# Patient Record
Sex: MALE | Race: WHITE | NOT HISPANIC OR LATINO | Employment: FULL TIME | URBAN - METROPOLITAN AREA
[De-identification: names, ages, dates, MRNs, and addresses within clinical notes are randomized per-mention and may not be internally consistent; named-entity substitution may affect disease eponyms.]

---

## 2017-06-02 ENCOUNTER — APPOINTMENT (OUTPATIENT)
Dept: MRI IMAGING | Facility: CLINIC | Age: 57
End: 2017-06-02
Attending: FAMILY MEDICINE
Payer: COMMERCIAL

## 2017-06-02 ENCOUNTER — HOSPITAL ENCOUNTER (EMERGENCY)
Facility: CLINIC | Age: 57
Discharge: HOME | End: 2017-06-02
Attending: FAMILY MEDICINE | Admitting: FAMILY MEDICINE
Payer: COMMERCIAL

## 2017-06-02 VITALS
DIASTOLIC BLOOD PRESSURE: 70 MMHG | RESPIRATION RATE: 17 BRPM | TEMPERATURE: 98.6 F | SYSTOLIC BLOOD PRESSURE: 130 MMHG | HEART RATE: 62 BPM | OXYGEN SATURATION: 97 %

## 2017-06-02 DIAGNOSIS — R29.898 LEFT LEG WEAKNESS: ICD-10-CM

## 2017-06-02 DIAGNOSIS — R27.0 ATAXIA: ICD-10-CM

## 2017-06-02 DIAGNOSIS — D64.9 ANEMIA, UNSPECIFIED TYPE: ICD-10-CM

## 2017-06-02 DIAGNOSIS — M54.12 C6 RADICULOPATHY: ICD-10-CM

## 2017-06-02 LAB
ALBUMIN SERPL-MCNC: 3.6 G/DL (ref 3.4–5)
ALP SERPL-CCNC: 49 U/L (ref 40–150)
ALT SERPL W P-5'-P-CCNC: 18 U/L (ref 0–70)
ANION GAP SERPL CALCULATED.3IONS-SCNC: 5 MMOL/L (ref 3–14)
AST SERPL W P-5'-P-CCNC: 18 U/L (ref 0–45)
BASOPHILS # BLD AUTO: 0 10E9/L (ref 0–0.2)
BASOPHILS NFR BLD AUTO: 0.5 %
BILIRUB SERPL-MCNC: 0.4 MG/DL (ref 0.2–1.3)
BUN SERPL-MCNC: 14 MG/DL (ref 7–30)
CALCIUM SERPL-MCNC: 8.9 MG/DL (ref 8.5–10.1)
CHLORIDE SERPL-SCNC: 108 MMOL/L (ref 94–109)
CO2 SERPL-SCNC: 28 MMOL/L (ref 20–32)
CREAT SERPL-MCNC: 1.01 MG/DL (ref 0.66–1.25)
DIFFERENTIAL METHOD BLD: ABNORMAL
EOSINOPHIL # BLD AUTO: 0.3 10E9/L (ref 0–0.7)
EOSINOPHIL NFR BLD AUTO: 4.2 %
ERYTHROCYTE [DISTWIDTH] IN BLOOD BY AUTOMATED COUNT: 12.7 % (ref 10–15)
GFR SERPL CREATININE-BSD FRML MDRD: 76 ML/MIN/1.7M2
GLUCOSE BLDC GLUCOMTR-MCNC: 135 MG/DL (ref 70–99)
GLUCOSE SERPL-MCNC: 114 MG/DL (ref 70–99)
HCT VFR BLD AUTO: 29.8 % (ref 40–53)
HGB BLD-MCNC: 10 G/DL (ref 13.3–17.7)
IMM GRANULOCYTES # BLD: 0.1 10E9/L (ref 0–0.4)
IMM GRANULOCYTES NFR BLD: 1.1 %
LYMPHOCYTES # BLD AUTO: 2 10E9/L (ref 0.8–5.3)
LYMPHOCYTES NFR BLD AUTO: 24.8 %
MCH RBC QN AUTO: 32.4 PG (ref 26.5–33)
MCHC RBC AUTO-ENTMCNC: 33.6 G/DL (ref 31.5–36.5)
MCV RBC AUTO: 96 FL (ref 78–100)
MONOCYTES # BLD AUTO: 0.5 10E9/L (ref 0–1.3)
MONOCYTES NFR BLD AUTO: 5.7 %
NEUTROPHILS # BLD AUTO: 5 10E9/L (ref 1.6–8.3)
NEUTROPHILS NFR BLD AUTO: 63.7 %
PLATELET # BLD AUTO: 248 10E9/L (ref 150–450)
POTASSIUM SERPL-SCNC: 4 MMOL/L (ref 3.4–5.3)
PROT SERPL-MCNC: 6.6 G/DL (ref 6.8–8.8)
RBC # BLD AUTO: 3.09 10E12/L (ref 4.4–5.9)
SODIUM SERPL-SCNC: 141 MMOL/L (ref 133–144)
TROPONIN I SERPL-MCNC: 0.04 UG/L (ref 0–0.04)
WBC # BLD AUTO: 7.9 10E9/L (ref 4–11)

## 2017-06-02 PROCEDURE — 99284 EMERGENCY DEPT VISIT MOD MDM: CPT | Mod: 25

## 2017-06-02 PROCEDURE — 93005 ELECTROCARDIOGRAM TRACING: CPT

## 2017-06-02 PROCEDURE — 25000128 H RX IP 250 OP 636: Performed by: FAMILY MEDICINE

## 2017-06-02 PROCEDURE — 99284 EMERGENCY DEPT VISIT MOD MDM: CPT | Mod: 25 | Performed by: FAMILY MEDICINE

## 2017-06-02 PROCEDURE — 85025 COMPLETE CBC W/AUTO DIFF WBC: CPT | Performed by: FAMILY MEDICINE

## 2017-06-02 PROCEDURE — 80053 COMPREHEN METABOLIC PANEL: CPT | Performed by: FAMILY MEDICINE

## 2017-06-02 PROCEDURE — 96361 HYDRATE IV INFUSION ADD-ON: CPT

## 2017-06-02 PROCEDURE — 82607 VITAMIN B-12: CPT | Performed by: FAMILY MEDICINE

## 2017-06-02 PROCEDURE — 70549 MR ANGIOGRAPH NECK W/O&W/DYE: CPT

## 2017-06-02 PROCEDURE — 00000146 ZZHCL STATISTIC GLUCOSE BY METER IP

## 2017-06-02 PROCEDURE — 70553 MRI BRAIN STEM W/O & W/DYE: CPT

## 2017-06-02 PROCEDURE — A9585 GADOBUTROL INJECTION: HCPCS | Performed by: FAMILY MEDICINE

## 2017-06-02 PROCEDURE — 84484 ASSAY OF TROPONIN QUANT: CPT | Performed by: FAMILY MEDICINE

## 2017-06-02 PROCEDURE — 93010 ELECTROCARDIOGRAM REPORT: CPT | Performed by: FAMILY MEDICINE

## 2017-06-02 PROCEDURE — 70544 MR ANGIOGRAPHY HEAD W/O DYE: CPT | Mod: XS

## 2017-06-02 PROCEDURE — 96374 THER/PROPH/DIAG INJ IV PUSH: CPT

## 2017-06-02 RX ORDER — NAPROXEN 500 MG/1
500 TABLET ORAL 2 TIMES DAILY PRN
Status: ON HOLD | COMMUNITY
End: 2022-03-10

## 2017-06-02 RX ORDER — SODIUM CHLORIDE 9 MG/ML
1000 INJECTION, SOLUTION INTRAVENOUS CONTINUOUS
Status: DISCONTINUED | OUTPATIENT
Start: 2017-06-02 | End: 2017-06-02 | Stop reason: HOSPADM

## 2017-06-02 RX ORDER — LORAZEPAM 2 MG/ML
.5-1 INJECTION INTRAMUSCULAR ONCE
Status: COMPLETED | OUTPATIENT
Start: 2017-06-02 | End: 2017-06-02

## 2017-06-02 RX ORDER — TRAZODONE HYDROCHLORIDE 100 MG/1
200 TABLET ORAL
COMMUNITY

## 2017-06-02 RX ORDER — GADOBUTROL 604.72 MG/ML
10 INJECTION INTRAVENOUS ONCE
Status: COMPLETED | OUTPATIENT
Start: 2017-06-02 | End: 2017-06-02

## 2017-06-02 RX ORDER — DULOXETIN HYDROCHLORIDE 60 MG/1
60 CAPSULE, DELAYED RELEASE ORAL DAILY
Status: ON HOLD | COMMUNITY
End: 2022-03-10

## 2017-06-02 RX ORDER — IOPAMIDOL 755 MG/ML
70 INJECTION, SOLUTION INTRAVASCULAR ONCE
Status: DISCONTINUED | OUTPATIENT
Start: 2017-06-02 | End: 2017-06-02 | Stop reason: HOSPADM

## 2017-06-02 RX ORDER — CLONAZEPAM 0.5 MG/1
1 TABLET ORAL AT BEDTIME
COMMUNITY

## 2017-06-02 RX ORDER — METHYLPREDNISOLONE 4 MG
TABLET, DOSE PACK ORAL
Qty: 21 TABLET | Refills: 0 | Status: SHIPPED | OUTPATIENT
Start: 2017-06-02 | End: 2022-03-07

## 2017-06-02 RX ORDER — MULTIVIT WITH MINERALS/LUTEIN
1 TABLET ORAL DAILY
COMMUNITY

## 2017-06-02 RX ADMIN — GADOBUTROL 10 ML: 604.72 INJECTION INTRAVENOUS at 16:47

## 2017-06-02 RX ADMIN — SODIUM CHLORIDE 1000 ML: 9 INJECTION, SOLUTION INTRAVENOUS at 16:31

## 2017-06-02 RX ADMIN — LORAZEPAM 0.5 MG: 2 INJECTION, SOLUTION INTRAMUSCULAR; INTRAVENOUS at 16:31

## 2017-06-02 ASSESSMENT — ENCOUNTER SYMPTOMS
ABDOMINAL PAIN: 0
DIARRHEA: 0
COUGH: 0
CHILLS: 0
FREQUENCY: 0
WHEEZING: 0
SINUS PRESSURE: 0
FEVER: 0
NUMBNESS: 1
SPEECH DIFFICULTY: 1
DIZZINESS: 1
DYSURIA: 0
SORE THROAT: 0
NAUSEA: 1
DIAPHORESIS: 0
WEAKNESS: 1
PALPITATIONS: 0
SHORTNESS OF BREATH: 0
CONSTIPATION: 0
VOMITING: 0
BLOOD IN STOOL: 0
HEADACHES: 1

## 2017-06-02 NOTE — ED NOTES
"Patient said, \"I been having pain down my left side for a couple of days.  I also have a headache that started yesterday.  I have shortness of breath that started last night.\"  Per girlfriend patient was stuttering and couldn't get a sentence out.\"  "

## 2017-06-02 NOTE — ED AVS SNAPSHOT
[facility] Emergency Department    [address] [facility] [address]    Phone:  [phone]    Fax:  [phone]                                       Nasim Montez   MRN: [id number]    Department:  [facility] Emergency Department   Date of Visit:  6/2/2017           Patient Information     Date Of Birth          1960        Your diagnoses for this visit were:     C6 radiculopathy start medrol dose pack and follow-up clinic.    Anemia, unspecified type Unclear cause.  follow-up clinic for testing of iron studies.  consider endoscopy for blood loss from intestinal tract.  B12 is pending.    Left leg weakness No signs of stroke on MRI.  These was a small tortuous vessel on MRI that could be tiny aneurysm.  consider repeat imaging as recommmended by radiology. Consider MRI spine (lumbar and/or cerical for central disc)    Ataxia Unclear cause.  await B12 test. normal imaging, labs.  could be related to central disc and the Left leg weakness       You were seen by Ian Renee MD.      Follow-up Information     Schedule an appointment as soon as possible for a visit with [facility].    Contact information:    [address] [facility] [address]          Follow up with [facility] Emergency Department.    Specialty:  EMERGENCY MEDICINE    Why:  As needed, If symptoms worsen    Contact information:    [address] [place] [id number] [phone]    Additional information:    The medical center is located at   [address] (between [address] and   [address] in [place], [place] miles north   of [place]).        Discharge Instructions         ICD-10-CM    1. C6 radiculopathy M54.12     start medrol dose pack and follow-up clinic.   2. Anemia, unspecified type D64.9     Unclear cause.  follow-up clinic for testing of iron studies.  consider endoscopy for blood loss from intestinal tract.  B12 is pending.   3. Left leg weakness M62.81     No signs of  stroke on MRI.  These was a small tortuous vessel on MRI that could be tiny aneurysm.  consider repeat imaging as recommmended by radiology. Consider MRI spine (lumbar and/or cerical for central disc)   4. Ataxia R27.0     Unclear cause.  await B12 test. normal imaging, labs.  could be related to central disc and the Left leg weakness         Anemia  Anemia is a condition that occurs when your body does not have enough healthy red blood cells (RBCs). Your RBCs are the parts of your blood that carry oxygen throughout your body. A protein called hemoglobin allows your RBCs to absorb and release oxygen. Without enough RBCs or hemoglobin, your body doesn't get enough oxygen. Symptoms of anemia may then occur.    Symptoms of anemia  Some people with anemia have no symptoms. But most people have symptoms that range from mild to severe. These can include:    Tiredness (fatigue)    Weakness    Pale skin    Shortness of breath    Dizziness or fainting    Rapid heartbeat    Trouble doing normal amounts of activity    Jaundice (yellowing of your eyes, skin, or mouth; dark urine)  Causes of anemia  Anemia can occur when your body:    Loses too much blood    Does not make enough RBCs    Destroys your RBCs at a faster rate than it can replace them    Does not make a normal amount of hemoglobin in your RBCs  These problems can occur for many reasons, including:    A condition that you are born with (congenital or inherited). This includes sickle cell disease or thalassemia.    Heavy bleeding for any reason, including injury, surgery, childbirth, or even heavy menstrual periods.    Being low in certain nutrients, such as iron, folate, or vitamin B12. This may be due to poor diet. Also, a condition like celiac disease or Crohn's disease can cause poor absorption of these nutrients    Certain chronic conditions like diabetes, arthritis, or kidney disease.    Certain chronic infections like tuberculosis or HIV.    Exposure to certain  medications, such as those used for chemotherapy.  There are different types of anemia. Your doctor can tell you more about the type of anemia you have and what may have caused it.  Diagnosing anemia  To diagnose anemia, your doctor gives you blood tests. These can include:    Complete blood cell count (CBC). This test measures the amounts of the different types of blood cells.    Blood smear. This test checks the size and shape of your blood cells. To perform the test, your doctor views a drop of your blood under a microscope. Your doctor uses a stain to make the blood cells easier to see.    Iron studies. These tests measure the amount of iron in your blood. Your body needs iron to make hemoglobin in your RBCs.    Vitamin B12 and folate studies. These tests check for some of the components that help give RBCs a normal size and shape.    Reticulocyte count. This test measures the amount of new RBCs that your bone marrow makes.    Hemoglobin electrophoresis. This test checks for problems with your hemoglobin in RBCs.  Treating anemia  Treatment for anemia is based on the type of anemia, its cause, and the severity of your symptoms. Treatments may include:    Diet changes. This involves increasing the amount of certain nutrients in your diet, such as iron, vitamin B12, or folate. Your doctor may also prescribe nutrient supplements.    Medications. Certain medications treat the cause of your anemia. Others help build new RBCs or relieve symptoms. If a medication is the cause of your anemia, you may need to stop or change it.    Blood transfusions. Replacing some of your blood can increase the number of healthy RBCs in your body.    Surgery. In some cases, your doctor can do surgery to treat the underlying cause of anemia. If you need surgery, your doctor will explain the procedure and outline the risks and benefits for you.  Long-term concerns  If you have a certain type of anemia, you can expect a full recovery after  treatment. If you have other types of anemia (especially a type you're born with), you will need to manage it for life. Your doctor can tell you more.    1208-7061 [employer]. [address] [place] [address], [place] [address]. All rights reserved. This information is not intended as a substitute for professional medical care. Always follow your healthcare professional's instructions.          Pinched Nerve in the Neck  A pinched nerve in the neck (cervical radiculopathy) is caused when the nerve that goes from the spinal cord to the arm is irritated or has pressure on it. This may be caused by a bulging spinal disk. A spinal disk is the cushion between each spinal bone. Or it may be caused by a narrowing of the spinal joint because of arthritis.    A pinched nerve can cause numbness, tingling, deep aching, or electrical shooting pain from the side of the neck all the way down to the fingers on one side.  A pinched nerve may begin after a sudden turning or bending force (such as in a car accident) or after a simple awkward movement. In either case, muscle spasm is commonly present and adds to the pain.  Home care  Follow these guidelines when caring for yourself at home:    Rest and relax the muscles. Use a comfortable pillow that supports your head and keeps your spine in a natural (neutral) position. Your head shouldn t be tilted forward or backward. A rolled-up towel may help for a custom fit. When standing or sitting, keep your neck in line with your body. Keep your head up and shoulders down. Stay away from activities that require you to move your neck a lot.    You can use heat and massage to help ease the pain. Take a hot shower or bath, or use a heating pad. You can also use a cold pack for relief. You can make a cold pack by wrapping a plastic bag of crushed or cubed ice in a thin towel. Try both heat and cold, and use the method that feels best. Do this for 20 minutes several times a day.    You may  use acetaminophen or ibuprofen to control pain, unless another pain medicine was prescribed. If you have chronic liver or kidney disease, talk with your health care provider before using these medicines. Also talk with your provider if you ve had a stomach ulcer or GI bleeding.    Reduce stress. Stress can make it longer for your pain to go away.    Do any exercises or stretches that were given to you as part of your discharge plan.    Wear a soft collar, if prescribed.    You may need surgery for a more serious injury.  Follow-up care  Follow up with your health care provider, or as advised, if you don t start to get better after 1 week. You may need more tests. Tell your provider about any fever, chills, or weight loss.  If X-rays were taken, a radiologist will look at them. You will be told of any new findings that may affect your care.  When to seek medical advice  Call your health care provider right away if any of these occur:    Pain becomes worse even after taking prescribed pain medicine    Weakness in the arm    Numbness in the arm gets worse    Trouble breathing or swallowing       6339-8413 [employer]. [address] [place] [address]. All rights reserved. This information is not intended as a substitute for professional medical care. Always follow your healthcare professional's instructions.          24 Hour Appointment Hotline       To make an appointment at any [facility] clinic, call [phone] ([phone]). If you don't have a family doctor or clinic, we will help you find one. [facility] clinics are conveniently located to serve the needs of you and your family.             Review of your medicines      START taking        Dose / Directions Last dose taken    methylPREDNISolone 4 MG tablet   Commonly known as:  MEDROL DOSEPAK   Quantity:  21 tablet        Follow package instructions   Refills:  0          Our records show that you are taking the medicines listed below.  If these are incorrect, please call your family doctor or clinic.        Dose / Directions Last dose taken    CENTRUM SILVER per tablet   Dose:  1 tablet        Take 1 tablet by mouth daily   Refills:  0        CLONAZEPAM PO   Dose:  0.5 mg        Take 0.5 mg by mouth At Bedtime   Refills:  0        DULOXETINE HCL PO   Dose:  60 mg        Take 60 mg by mouth daily   Refills:  0        NAPROXEN PO   Dose:  500 mg        Take 500 mg by mouth daily   Refills:  0        RISPERIDONE PO   Dose:  0.5 mg        Take 0.5 mg by mouth 2 times daily   Refills:  0        TRAZODONE HCL PO   Dose:  100 mg        Take 100 mg by mouth At Bedtime   Refills:  0                Prescriptions were sent or printed at these locations (1 Prescription)                   [place], [place] - [address] [facility]   [address] [facility] [address]    Telephone:  [phone]   Fax:  [phone]   Hours:                  E-Prescribed (1 of 1)         methylPREDNISolone (MEDROL DOSEPAK) 4 MG tablet                Procedures and tests performed during your visit     CBC with platelets differential    Comprehensive metabolic panel    EKG 12 lead    Glucose by meter    Head MRA w/o contrast - STROKE PROTOCOL    MR Brain w/o & w Contrast    Neck MRA w & w/o contrast - STROKE PROTOCOL    Troponin I      Orders Needing Specimen Collection     None      Pending Results     No orders found from 5/31/2017 to 6/3/2017.            Pending Culture Results     No orders found from 5/31/2017 to 6/3/2017.            Pending Results Instructions     If you had any lab results that were not finalized at the time of your Discharge, you can call the ED Lab Result RN at [phone]. You will be contacted by this team for any positive Lab results or changes in treatment. The nurses are available 7 days a week from 10A to 6:30P.  You can leave a message 24 hours per day and they will return your call.        Test Results From Your Hospital  Stay        6/2/2017  4:11 PM      Component Results     Component Value Ref Range & Units Status    Glucose 135 (H) 70 - 99 mg/dL Final         6/2/2017  6:07 PM      Narrative     MR BRAIN WITHOUT AND WITH CONTRAST 6/2/2017 5:30 PM    HISTORY: Ataxia and acute weakness.    COMPARISON: None.    TECHNIQUE: Sagittal T1, axial T2, axial FLAIR, axial GRE, axial  diffusion scan, coronal FLAIR, axial post Gd enhanced T1 weighted  images. 10 mL Gadavist.    FINDINGS: No intracranial hemorrhage, mass, or recent infarct. No  pathologic enhancement with gadolinium. No significant white matter  disease in the hemispheres but minimal patchy T2 hyperintensity in the  central aida likely small vessel ischemic change. Moderate membrane  thickening right maxillary antrum. Possible polyp in the right  posterior nasal region.        Impression     IMPRESSION:  1. Brain appears normal.  2. Sinus disease and right posterior nasal fullness which could be a  polyp.    KIMI HARRIS MD         6/2/2017  4:40 PM      Component Results     Component Value Ref Range & Units Status    WBC 7.9 4.0 - 11.0 10e9/L Final    RBC Count 3.09 (L) 4.4 - 5.9 10e12/L Final    Hemoglobin 10.0 (L) 13.3 - 17.7 g/dL Final    Hematocrit 29.8 (L) 40.0 - 53.0 % Final    MCV 96 78 - 100 fl Final    MCH 32.4 26.5 - 33.0 pg Final    MCHC 33.6 31.5 - 36.5 g/dL Final    RDW 12.7 10.0 - 15.0 % Final    Platelet Count 248 150 - 450 10e9/L Final    Diff Method Automated Method  Final    % Neutrophils 63.7 % Final    % Lymphocytes 24.8 % Final    % Monocytes 5.7 % Final    % Eosinophils 4.2 % Final    % Basophils 0.5 % Final    % Immature Granulocytes 1.1 % Final    Absolute Neutrophil 5.0 1.6 - 8.3 10e9/L Final    Absolute Lymphocytes 2.0 0.8 - 5.3 10e9/L Final    Absolute Monocytes 0.5 0.0 - 1.3 10e9/L Final    Absolute Eosinophils 0.3 0.0 - 0.7 10e9/L Final    Absolute Basophils 0.0 0.0 - 0.2 10e9/L Final    Abs Immature Granulocytes 0.1 0 - 0.4 10e9/L Final          6/2/2017  4:58 PM      Component Results     Component Value Ref Range & Units Status    Sodium 141 133 - 144 mmol/L Final    Potassium 4.0 3.4 - 5.3 mmol/L Final    Chloride 108 94 - 109 mmol/L Final    Carbon Dioxide 28 20 - 32 mmol/L Final    Anion Gap 5 3 - 14 mmol/L Final    Glucose 114 (H) 70 - 99 mg/dL Final    Urea Nitrogen 14 7 - 30 mg/dL Final    Creatinine 1.01 0.66 - 1.25 mg/dL Final    GFR Estimate 76 >60 mL/min/1.7m2 Final    Non  GFR Calc    GFR Estimate If Black >90   GFR Calc   >60 mL/min/1.7m2 Final    Calcium 8.9 8.5 - 10.1 mg/dL Final    Bilirubin Total 0.4 0.2 - 1.3 mg/dL Final    Albumin 3.6 3.4 - 5.0 g/dL Final    Protein Total 6.6 (L) 6.8 - 8.8 g/dL Final    Alkaline Phosphatase 49 40 - 150 U/L Final    ALT 18 0 - 70 U/L Final    AST 18 0 - 45 U/L Final         6/2/2017  4:58 PM      Component Results     Component Value Ref Range & Units Status    Troponin I ES 0.042 0.000 - 0.045 ug/L Final    The 99th percentile for upper reference range is 0.045 ug/L.  Troponin values   in   the range of 0.045 - 0.120 ug/L may be associated with risks of adverse   clinical events.           6/2/2017  6:07 PM      Narrative     MRA ANGIOGRAM HEAD WITHOUT CONTRAST 6/2/2017 5:17 PM    HISTORY  Pain left neck to left hand.    COMPARISON: None.    TECHNIQUE: Routine [place] MRA.    FINDINGS: Large-caliber vessels are patent. Both anterior cerebral  arteries are supplied from the right which is a normal variant. There  is a hypoplastic left A1 segment. The left posterior cerebral artery  is supplied from the anterior circulation which is also a variant.  There is a prominent vascular structure in the region of the anterior  communicating artery which I believe is a tortuous right A1 segment. I  cannot entirely rule out the possibility of an aneurysm and I would  recommend a six-month [place] MRA to ensure that this area is  stable.        Impression      "IMPRESSION:  1. Nothing acute.  2. Tortuous right A1 segment in the region of the anterior  communicating artery which I believe is simulating an aneurysm. I  cannot completely exclude the possibility of a tiny anterior  communicating artery aneurysm, and a six-month follow-up is  recommended to ensure that this area stable.    KIMI HARRIS MD         6/2/2017  6:07 PM      Narrative     MRA ANGIOGRAM NECK WITHOUT AND WITH CONTRAST   6/2/2017 5:31 PM     HISTORY: Ataxia. Numbness left arm.    TECHNIQUE: 2D time-of-flight MR angiogram of the neck without contrast  and 3D MR angiogram of the neck with 10 mL gadolinium IV.  Estimates  of carotid stenoses are made relative to the distal internal carotid  artery diameters except as noted.    COMPARISON: None.    FINDINGS:    Right carotid: Normal.    Left carotid: Normal.    Vertebral and basilar: Normal.    Aortic arch and intrathoracic arteries: Not well seen.        Impression     IMPRESSION: Normal MR angiogram of the neck. No evidence for  dissection.    KIMI HARRIS MD                Thank you for choosing [facility]       Thank you for choosing [facility] for your care. Our goal is always to provide you with excellent care. Hearing back from our patients is one way we can continue to improve our services. Please take a few minutes to complete the written survey that you may receive in the mail after you visit with us. Thank you!        [employer] Information     [employer] lets you send messages to your doctor, view your test results, renew your prescriptions, schedule appointments and more. To sign up, go to [email or web address] . Click on \"Log in\" on the left side of the screen, which will take you to the Welcome page. Then click on \"Sign up Now\" on the right side of the page.     You will be asked to enter the access code listed below, as well as some personal information. Please follow the directions to create your username and password.     Your access code " is: [email or web address]  Expires: 2017  6:39 PM     Your access code will  in 90 days. If you need help or a new code, please call your [facility] clinic or [phone].        Care EveryWhere ID     This is your Care EveryWhere ID. This could be used by other organizations to access your [facility] medical records  [id number]        After Visit Summary       This is your record. Keep this with you and show to your community pharmacist(s) and doctor(s) at your next visit.

## 2017-06-02 NOTE — LETTER
[facility] EMERGENCY DEPARTMENT  [address] [facility] [address]  Phone: [phone]  Fax: [phone]    June 6, 2017      RE: Nasim Montez  [address]   [facility] [address]       To whom it may concern:    Nasim Montez was seen in our clinic today. He may return to work with the following: No working or lifting restrictions on or about June 6, 2017.          Sincerely,      Ian Renee MD

## 2017-06-02 NOTE — ED PROVIDER NOTES
History     Chief Complaint   Patient presents with     Numbness     numbness radiating down his left side. wife states he started studdering 1.5 hours ago. ha x 2 days     HPI  Nasim Montez is a 57 year old male who presents with ataxia and weakness and numbness starting in 48 hours ago.  He noted at that time that he was dizzy with vertigo, he noted weakness in the lower extremity left side, ataxic with ambulation.  Also with paresthesias and numbness in the left arm especially in the radial aspect of the arm with a C6 distribution especially with dorsiflexion of the wrist,.  Some numbness as well in the left leg as well.  Today his wife noted stuttering possibly one and a half hours prior to his presentation.  Remainder of the symptoms have been ongoing for the last 48 hours.  Denies head injury.  No seizure history.  No significant headache history.  Does have a headache frontal moderate intensity.  No change in his swallowing.  No CVA history.    followed by [facility]  bipolar disorder    Medications  Current Outpatient Prescriptions   Medication Sig Dispense Refill     CLONAZEPAM PO Take 0.5 mg by mouth At Bedtime       RISPERIDONE PO Take 0.5 mg by mouth 2 times daily       TRAZODONE HCL PO Take 100 mg by mouth At Bedtime       DULOXETINE HCL PO Take 60 mg by mouth daily       NAPROXEN PO Take 500 mg by mouth daily       Multiple Vitamins-Minerals (CENTRUM SILVER) per tablet Take 1 tablet by mouth daily             I have reviewed the Medications, Allergies, Past Medical and Surgical History, and Social History in the Epic system.    Review of Systems   Constitutional: Negative for chills, diaphoresis and fever.   HENT: Negative for ear pain, sinus pressure and sore throat.    Eyes: Negative for visual disturbance.   Respiratory: Negative for cough, shortness of breath and wheezing.    Cardiovascular: Negative for chest pain and palpitations.   Gastrointestinal: Positive for nausea. Negative for abdominal  pain, blood in stool, constipation, diarrhea and vomiting.   Genitourinary: Negative for dysuria, frequency and urgency.   Skin: Negative for rash.   Neurological: Positive for dizziness, speech difficulty, weakness, numbness and headaches.   All other systems reviewed and are negative.        Physical Exam   BP: 135/75  Heart Rate: 92  Resp: 12  SpO2: 97 %  Physical Exam   Constitutional: He is oriented to person, place, and time. No distress.   HENT:   Mouth/Throat: Oropharynx is clear and moist.   Neck: Neck supple.   Cardiovascular: Exam reveals no gallop and no friction rub.    No murmur heard.  Pulmonary/Chest: Effort normal. No respiratory distress. He has no wheezes. He has no rales.   Abdominal: He exhibits no distension. There is no tenderness. There is no rebound.   Musculoskeletal: He exhibits no edema.   Neurological: He is alert and oriented to person, place, and time. He displays normal reflexes. No cranial nerve deficit. He exhibits abnormal muscle tone. Coordination normal.   Skin: No rash noted. He is not diaphoretic.     Weakness in dorsiflexion of the left wrist and decreased sensation in the C6 distribution on the radial aspect of the arm.    ED Course     ED Course     Procedures        EKG done at 1609 hrs. demonstrates a sinus rhythm at 97 bpm with a normal axis and no ST change.  No acute changes.  Normal R progression and no Q waves.  Normal conduction.  Normal intervals.  No ectopy.  Impression sinus rhythm 97 bpm no old EKG to compare         The patient has stroke symptoms:           ED Stroke specific documentation           NIHSS PDF          Protocol PDF     Patient last known well time: 48 hours ago  ED Provider first to bedside at: 1635  Patient was not treated with TPA due to the following reason(s):  Out of the treatment time window    National Institutes [employer] Stroke Scale (Baseline)  Time Performed: 1635        Score    Level of consciousness: (0)   Alert, keenly responsive     LOC questions: (0)   Answers both questions correctly    LOC commands: (0)   Performs both tasks correctly    Best gaze: (0)   Normal    Visual: (0)   No visual loss    Facial palsy: (0)   Normal symmetrical movements    Motor arm (left): (0)   No drift  - but weakness in C6 distribution    Motor arm (right): (0)   No drift    Motor leg (left): (1)   Drift    Motor leg (right): (0)   No drift    Limb ataxia: (0)   Absent    Sensory: (1)   Mild to moderate sensory loss  - but focal C6    Best language: (0)   Normal- no aphasia    Dysarthria: (0)   Normal    Extinction and inattention: (0)   No abnormality        Total Score:  2        Stroke Mimics were considered (including migraine headache, seizure disorder, hypoglycemia (or hyperglycemia), head or spinal trauma, CNS infection, Toxin ingestion and shock state (e.g. sepsis) .      Evaluation/Treatement was delayed due to: The patient's presentation was after 48 hours.  MRI would only be available for another 30 minutes.  Some of the symptoms were posterior circulation with ataxia and vertiginous symptoms.  I new need MRI this evening.  There are no significant delays to MRI over CT and therefore he was sent to MRI.  Initially they were only available to perform the MRI and not the MRA.  However at a callback that they could perform both.                  Results for orders placed or performed during the hospital encounter of 06/02/17   MR Brain w/o & w Contrast    Narrative    MR BRAIN WITHOUT AND WITH CONTRAST 6/2/2017 5:30 PM    HISTORY: Ataxia and acute weakness.    COMPARISON: None.    TECHNIQUE: Sagittal T1, axial T2, axial FLAIR, axial GRE, axial  diffusion scan, coronal FLAIR, axial post Gd enhanced T1 weighted  images. 10 mL Gadavist.    FINDINGS: No intracranial hemorrhage, mass, or recent infarct. No  pathologic enhancement with gadolinium. No significant white matter  disease in the hemispheres but minimal patchy T2 hyperintensity in the  central aida  likely small vessel ischemic change. Moderate membrane  thickening right maxillary antrum. Possible polyp in the right  posterior nasal region.      Impression    IMPRESSION:  1. Brain appears normal.  2. Sinus disease and right posterior nasal fullness which could be a  polyp.    KIMI HARRIS MD   Head MRA w/o contrast - STROKE PROTOCOL    Narrative    MRA ANGIOGRAM HEAD WITHOUT CONTRAST 6/2/2017 5:17 PM    HISTORY  Pain left neck to left hand.    COMPARISON: None.    TECHNIQUE: Routine [facility] MRA.    FINDINGS: Large-caliber vessels are patent. Both anterior cerebral  arteries are supplied from the right which is a normal variant. There  is a hypoplastic left A1 segment. The left posterior cerebral artery  is supplied from the anterior circulation which is also a variant.  There is a prominent vascular structure in the region of the anterior  communicating artery which I believe is a tortuous right A1 segment. I  cannot entirely rule out the possibility of an aneurysm and I would  recommend a six-month [facility] MRA to ensure that this area is  stable.      Impression    IMPRESSION:  1. Nothing acute.  2. Tortuous right A1 segment in the region of the anterior  communicating artery which I believe is simulating an aneurysm. I  cannot completely exclude the possibility of a tiny anterior  communicating artery aneurysm, and a six-month follow-up is  recommended to ensure that this area stable.    KIMI HARRIS MD   Neck MRA w & w/o contrast - STROKE PROTOCOL    Narrative    MRA ANGIOGRAM NECK WITHOUT AND WITH CONTRAST   6/2/2017 5:31 PM     HISTORY: Ataxia. Numbness left arm.    TECHNIQUE: 2D time-of-flight MR angiogram of the neck without contrast  and 3D MR angiogram of the neck with 10 mL gadolinium IV.  Estimates  of carotid stenoses are made relative to the distal internal carotid  artery diameters except as noted.    COMPARISON: None.    FINDINGS:    Right carotid: Normal.    Left  carotid: Normal.    Vertebral and basilar: Normal.    Aortic arch and intrathoracic arteries: Not well seen.      Impression    IMPRESSION: Normal MR angiogram of the neck. No evidence for  dissection.    KIMI HARRIS MD   Glucose by meter   Result Value Ref Range    Glucose 135 (H) 70 - 99 mg/dL   CBC with platelets differential   Result Value Ref Range    WBC 7.9 4.0 - 11.0 10e9/L    RBC Count 3.09 (L) 4.4 - 5.9 10e12/L    Hemoglobin 10.0 (L) 13.3 - 17.7 g/dL    Hematocrit 29.8 (L) 40.0 - 53.0 %    MCV 96 78 - 100 fl    MCH 32.4 26.5 - 33.0 pg    MCHC 33.6 31.5 - 36.5 g/dL    RDW 12.7 10.0 - 15.0 %    Platelet Count 248 150 - 450 10e9/L    Diff Method Automated Method     % Neutrophils 63.7 %    % Lymphocytes 24.8 %    % Monocytes 5.7 %    % Eosinophils 4.2 %    % Basophils 0.5 %    % Immature Granulocytes 1.1 %    Absolute Neutrophil 5.0 1.6 - 8.3 10e9/L    Absolute Lymphocytes 2.0 0.8 - 5.3 10e9/L    Absolute Monocytes 0.5 0.0 - 1.3 10e9/L    Absolute Eosinophils 0.3 0.0 - 0.7 10e9/L    Absolute Basophils 0.0 0.0 - 0.2 10e9/L    Abs Immature Granulocytes 0.1 0 - 0.4 10e9/L   Comprehensive metabolic panel   Result Value Ref Range    Sodium 141 133 - 144 mmol/L    Potassium 4.0 3.4 - 5.3 mmol/L    Chloride 108 94 - 109 mmol/L    Carbon Dioxide 28 20 - 32 mmol/L    Anion Gap 5 3 - 14 mmol/L    Glucose 114 (H) 70 - 99 mg/dL    Urea Nitrogen 14 7 - 30 mg/dL    Creatinine 1.01 0.66 - 1.25 mg/dL    GFR Estimate 76 >60 mL/min/1.7m2    GFR Estimate If Black >90   GFR Calc   >60 mL/min/1.7m2    Calcium 8.9 8.5 - 10.1 mg/dL    Bilirubin Total 0.4 0.2 - 1.3 mg/dL    Albumin 3.6 3.4 - 5.0 g/dL    Protein Total 6.6 (L) 6.8 - 8.8 g/dL    Alkaline Phosphatase 49 40 - 150 U/L    ALT 18 0 - 70 U/L    AST 18 0 - 45 U/L   Troponin I   Result Value Ref Range    Troponin I ES 0.042 0.000 - 0.045 ug/L         Assessments & Plan (with Medical Decision Making)     MDM: Nasim Montez is a 57 year old male Who presented  with a mix of neurologic symptoms that had onset 48 hours prior. This was associated with vertigo, numbness and paresthesias in the C6 distribution of the left arm, A sense of left leg weakness, ataxia, and possible slurred speech this evening.  I was alerted to MRI only being available for proximally 30 minutes from the time of his presentation.  Some of his symptoms were posterior circulation including ataxia and vertigo, and as this had been ongoing for 48 hours  I moved immediately to MRI To definitively exclude CNS cause.  MRI was reassuring but incidental findings Were identified - including a tiny possible aneurysm vs tortuous vessels. [facility]. He's given precautions for return here.    I have reviewed the nursing notes.    I have reviewed the findings, diagnosis, plan and need for follow up with the patient.    New Prescriptions    No medications on file       Final diagnoses:   C6 radiculopathy - start medrol dose pack and follow-up clinic.   Anemia, unspecified type - Unclear cause.  follow-up clinic for testing of iron studies.  consider endoscopy for blood loss from intestinal tract.  B12 is pending.   Left leg weakness - No signs of stroke on MRI.  These was a small tortuous vessel on MRI that could be tiny aneurysm.  consider repeat imaging as recommmended by radiology. Consider MRI spine (lumbar and/or cerical for central disc)   Ataxia - Unclear cause.  await B12 test. normal imaging, labs.  could be related to central disc and the Left leg weakness       6/2/2017   [facility] EMERGENCY DEPARTMENT     Ian Renee MD  06/02/17 [id number]

## 2017-06-02 NOTE — LETTER
[facility] EMERGENCY DEPARTMENT  [address] [facility] [address]  [phone]    2017    Nasim Montez  [address]   [place] [address]  [phone] (home)     : 1960      To Whom it may concern:    Nasim Montez was seen in our Emergency Department today, 2017.  I expect his condition to improve over the next 3 days.  He may return to work/school when improved.    Sincerely,        Ian Renee

## 2017-06-02 NOTE — ED AVS SNAPSHOT
[facility] Emergency Department    [address] [facility] [address]    Phone:  [phone]    Fax:  [phone]                                       Nasim Montez   MRN: [id number]    Department:  [facility] Emergency Department   Date of Visit:  6/2/2017           After Visit Summary Signature Page     I have received my discharge instructions, and my questions have been answered. I have discussed any challenges I see with this plan with the nurse or doctor.    ..........................................................................................................................................  Patient/Patient Representative Signature      ..........................................................................................................................................  Patient Representative Print Name and Relationship to Patient    ..................................................               ................................................  Date                                            Time    ..........................................................................................................................................  Reviewed by Signature/Title    ...................................................              ..............................................  Date                                                            Time

## 2017-06-02 NOTE — DISCHARGE INSTRUCTIONS
ICD-10-CM    1. C6 radiculopathy M54.12     start medrol dose pack and follow-up clinic.   2. Anemia, unspecified type D64.9     Unclear cause.  follow-up clinic for testing of iron studies.  consider endoscopy for blood loss from intestinal tract.  B12 is pending.   3. Left leg weakness M62.81     No signs of stroke on MRI.  These was a small tortuous vessel on MRI that could be tiny aneurysm.  consider repeat imaging as recommmended by radiology. Consider MRI spine (lumbar and/or cerical for central disc)   4. Ataxia R27.0     Unclear cause.  await B12 test. normal imaging, labs.  could be related to central disc and the Left leg weakness         Anemia  Anemia is a condition that occurs when your body does not have enough healthy red blood cells (RBCs). Your RBCs are the parts of your blood that carry oxygen throughout your body. A protein called hemoglobin allows your RBCs to absorb and release oxygen. Without enough RBCs or hemoglobin, your body doesn't get enough oxygen. Symptoms of anemia may then occur.    Symptoms of anemia  Some people with anemia have no symptoms. But most people have symptoms that range from mild to severe. These can include:    Tiredness (fatigue)    Weakness    Pale skin    Shortness of breath    Dizziness or fainting    Rapid heartbeat    Trouble doing normal amounts of activity    Jaundice (yellowing of your eyes, skin, or mouth; dark urine)  Causes of anemia  Anemia can occur when your body:    Loses too much blood    Does not make enough RBCs    Destroys your RBCs at a faster rate than it can replace them    Does not make a normal amount of hemoglobin in your RBCs  These problems can occur for many reasons, including:    A condition that you are born with (congenital or inherited). This includes sickle cell disease or thalassemia.    Heavy bleeding for any reason, including injury, surgery, childbirth, or even heavy menstrual periods.    Being low in certain nutrients, such as  iron, folate, or vitamin B12. This may be due to poor diet. Also, a condition like celiac disease or Crohn's disease can cause poor absorption of these nutrients    Certain chronic conditions like diabetes, arthritis, or kidney disease.    Certain chronic infections like tuberculosis or HIV.    Exposure to certain medications, such as those used for chemotherapy.  There are different types of anemia. Your doctor can tell you more about the type of anemia you have and what may have caused it.  Diagnosing anemia  To diagnose anemia, your doctor gives you blood tests. These can include:    Complete blood cell count (CBC). This test measures the amounts of the different types of blood cells.    Blood smear. This test checks the size and shape of your blood cells. To perform the test, your doctor views a drop of your blood under a microscope. Your doctor uses a stain to make the blood cells easier to see.    Iron studies. These tests measure the amount of iron in your blood. Your body needs iron to make hemoglobin in your RBCs.    Vitamin B12 and folate studies. These tests check for some of the components that help give RBCs a normal size and shape.    Reticulocyte count. This test measures the amount of new RBCs that your bone marrow makes.    Hemoglobin electrophoresis. This test checks for problems with your hemoglobin in RBCs.  Treating anemia  Treatment for anemia is based on the type of anemia, its cause, and the severity of your symptoms. Treatments may include:    Diet changes. This involves increasing the amount of certain nutrients in your diet, such as iron, vitamin B12, or folate. Your doctor may also prescribe nutrient supplements.    Medications. Certain medications treat the cause of your anemia. Others help build new RBCs or relieve symptoms. If a medication is the cause of your anemia, you may need to stop or change it.    Blood transfusions. Replacing some of your blood can increase the number of  healthy RBCs in your body.    Surgery. In some cases, your doctor can do surgery to treat the underlying cause of anemia. If you need surgery, your doctor will explain the procedure and outline the risks and benefits for you.  Long-term concerns  If you have a certain type of anemia, you can expect a full recovery after treatment. If you have other types of anemia (especially a type you're born with), you will need to manage it for life. Your doctor can tell you more.    7548-6033 [employer]. [address] [place], [place] [id number]. All rights reserved. This information is not intended as a substitute for professional medical care. Always follow your healthcare professional's instructions.          Pinched Nerve in the Neck  A pinched nerve in the neck (cervical radiculopathy) is caused when the nerve that goes from the spinal cord to the arm is irritated or has pressure on it. This may be caused by a bulging spinal disk. A spinal disk is the cushion between each spinal bone. Or it may be caused by a narrowing of the spinal joint because of arthritis.    A pinched nerve can cause numbness, tingling, deep aching, or electrical shooting pain from the side of the neck all the way down to the fingers on one side.  A pinched nerve may begin after a sudden turning or bending force (such as in a car accident) or after a simple awkward movement. In either case, muscle spasm is commonly present and adds to the pain.  Home care  Follow these guidelines when caring for yourself at home:    Rest and relax the muscles. Use a comfortable pillow that supports your head and keeps your spine in a natural (neutral) position. Your head shouldn t be tilted forward or backward. A rolled-up towel may help for a custom fit. When standing or sitting, keep your neck in line with your body. Keep your head up and shoulders down. Stay away from activities that require you to move your neck a lot.    You can use heat and  massage to help ease the pain. Take a hot shower or bath, or use a heating pad. You can also use a cold pack for relief. You can make a cold pack by wrapping a plastic bag of crushed or cubed ice in a thin towel. Try both heat and cold, and use the method that feels best. Do this for 20 minutes several times a day.    You may use acetaminophen or ibuprofen to control pain, unless another pain medicine was prescribed. If you have chronic liver or kidney disease, talk with your health care provider before using these medicines. Also talk with your provider if you ve had a stomach ulcer or GI bleeding.    Reduce stress. Stress can make it longer for your pain to go away.    Do any exercises or stretches that were given to you as part of your discharge plan.    Wear a soft collar, if prescribed.    You may need surgery for a more serious injury.  Follow-up care  Follow up with your health care provider, or as advised, if you don t start to get better after 1 week. You may need more tests. Tell your provider about any fever, chills, or weight loss.  If X-rays were taken, a radiologist will look at them. You will be told of any new findings that may affect your care.  When to seek medical advice  Call your health care provider right away if any of these occur:    Pain becomes worse even after taking prescribed pain medicine    Weakness in the arm    Numbness in the arm gets worse    Trouble breathing or swallowing       5564-8885 [employer]. [address] [place] [address], [place] [address]. All rights reserved. This information is not intended as a substitute for professional medical care. Always follow your healthcare professional's instructions.

## 2017-06-03 LAB — VIT B12 SERPL-MCNC: 351 PG/ML (ref 193–986)

## 2019-06-25 ENCOUNTER — RECORDS - HEALTHEAST (OUTPATIENT)
Dept: ADMINISTRATIVE | Facility: OTHER | Age: 59
End: 2019-06-25

## 2019-06-25 ENCOUNTER — OFFICE VISIT - HEALTHEAST (OUTPATIENT)
Dept: FAMILY MEDICINE | Facility: CLINIC | Age: 59
End: 2019-06-25

## 2019-06-25 DIAGNOSIS — S46.101A INJURY OF TENDON OF LONG HEAD OF BICEPS, RIGHT, INITIAL ENCOUNTER: ICD-10-CM

## 2019-06-25 DIAGNOSIS — M25.511 ACUTE PAIN OF RIGHT SHOULDER: ICD-10-CM

## 2020-04-28 ENCOUNTER — APPOINTMENT (OUTPATIENT)
Dept: GENERAL RADIOLOGY | Facility: CLINIC | Age: 60
End: 2020-04-28
Attending: EMERGENCY MEDICINE
Payer: COMMERCIAL

## 2020-04-28 ENCOUNTER — HOSPITAL ENCOUNTER (EMERGENCY)
Facility: CLINIC | Age: 60
Discharge: HOME | End: 2020-04-29
Attending: EMERGENCY MEDICINE | Admitting: EMERGENCY MEDICINE
Payer: COMMERCIAL

## 2020-04-28 DIAGNOSIS — R07.9 ACUTE CHEST PAIN: ICD-10-CM

## 2020-04-28 LAB
ANION GAP SERPL CALCULATED.3IONS-SCNC: 7 MMOL/L (ref 3–14)
BASOPHILS # BLD AUTO: 0 10E9/L (ref 0–0.2)
BASOPHILS NFR BLD AUTO: 0.4 %
BUN SERPL-MCNC: 10 MG/DL (ref 7–30)
CALCIUM SERPL-MCNC: 8.4 MG/DL (ref 8.5–10.1)
CHLORIDE SERPL-SCNC: 108 MMOL/L (ref 94–109)
CO2 SERPL-SCNC: 25 MMOL/L (ref 20–32)
CREAT SERPL-MCNC: 0.95 MG/DL (ref 0.66–1.25)
DIFFERENTIAL METHOD BLD: NORMAL
EOSINOPHIL # BLD AUTO: 0.4 10E9/L (ref 0–0.7)
EOSINOPHIL NFR BLD AUTO: 6.3 %
ERYTHROCYTE [DISTWIDTH] IN BLOOD BY AUTOMATED COUNT: 11.9 % (ref 10–15)
GFR SERPL CREATININE-BSD FRML MDRD: 86 ML/MIN/{1.73_M2}
GLUCOSE SERPL-MCNC: 132 MG/DL (ref 70–99)
HCT VFR BLD AUTO: 41.1 % (ref 40–53)
HGB BLD-MCNC: 14.2 G/DL (ref 13.3–17.7)
IMM GRANULOCYTES # BLD: 0 10E9/L (ref 0–0.4)
IMM GRANULOCYTES NFR BLD: 0.4 %
LYMPHOCYTES # BLD AUTO: 2.5 10E9/L (ref 0.8–5.3)
LYMPHOCYTES NFR BLD AUTO: 36.2 %
MCH RBC QN AUTO: 32.1 PG (ref 26.5–33)
MCHC RBC AUTO-ENTMCNC: 34.5 G/DL (ref 31.5–36.5)
MCV RBC AUTO: 93 FL (ref 78–100)
MONOCYTES # BLD AUTO: 0.5 10E9/L (ref 0–1.3)
MONOCYTES NFR BLD AUTO: 7.2 %
NEUTROPHILS # BLD AUTO: 3.5 10E9/L (ref 1.6–8.3)
NEUTROPHILS NFR BLD AUTO: 49.5 %
NRBC # BLD AUTO: 0 10*3/UL
NRBC BLD AUTO-RTO: 0 /100
PLATELET # BLD AUTO: 210 10E9/L (ref 150–450)
POTASSIUM SERPL-SCNC: 3.5 MMOL/L (ref 3.4–5.3)
RBC # BLD AUTO: 4.42 10E12/L (ref 4.4–5.9)
SODIUM SERPL-SCNC: 140 MMOL/L (ref 133–144)
TROPONIN I SERPL-MCNC: 0.02 UG/L (ref 0–0.04)
WBC # BLD AUTO: 7 10E9/L (ref 4–11)

## 2020-04-28 PROCEDURE — 85025 COMPLETE CBC W/AUTO DIFF WBC: CPT | Performed by: EMERGENCY MEDICINE

## 2020-04-28 PROCEDURE — 93010 ELECTROCARDIOGRAM REPORT: CPT | Mod: Z6 | Performed by: EMERGENCY MEDICINE

## 2020-04-28 PROCEDURE — 71046 X-RAY EXAM CHEST 2 VIEWS: CPT

## 2020-04-28 PROCEDURE — 25000132 ZZH RX MED GY IP 250 OP 250 PS 637: Performed by: EMERGENCY MEDICINE

## 2020-04-28 PROCEDURE — 93005 ELECTROCARDIOGRAM TRACING: CPT | Performed by: EMERGENCY MEDICINE

## 2020-04-28 PROCEDURE — 25000125 ZZHC RX 250: Performed by: EMERGENCY MEDICINE

## 2020-04-28 PROCEDURE — 84484 ASSAY OF TROPONIN QUANT: CPT | Performed by: EMERGENCY MEDICINE

## 2020-04-28 PROCEDURE — 80048 BASIC METABOLIC PNL TOTAL CA: CPT | Performed by: EMERGENCY MEDICINE

## 2020-04-28 PROCEDURE — 99285 EMERGENCY DEPT VISIT HI MDM: CPT | Mod: 25 | Performed by: EMERGENCY MEDICINE

## 2020-04-28 RX ORDER — ASPIRIN 81 MG/1
324 TABLET, CHEWABLE ORAL ONCE
Status: COMPLETED | OUTPATIENT
Start: 2020-04-28 | End: 2020-04-28

## 2020-04-28 RX ORDER — NITROGLYCERIN 0.4 MG/1
0.4 TABLET SUBLINGUAL EVERY 5 MIN PRN
Status: DISCONTINUED | OUTPATIENT
Start: 2020-04-28 | End: 2020-04-29 | Stop reason: HOSPADM

## 2020-04-28 RX ADMIN — NITROGLYCERIN 0.4 MG: 0.4 TABLET SUBLINGUAL at 23:27

## 2020-04-28 RX ADMIN — LIDOCAINE HYDROCHLORIDE 30 ML: 20 SOLUTION ORAL; TOPICAL at 23:08

## 2020-04-28 RX ADMIN — ASPIRIN 81 MG 324 MG: 81 TABLET ORAL at 23:08

## 2020-04-28 ASSESSMENT — MIFFLIN-ST. JEOR: SCORE: 1926.63

## 2020-04-28 NOTE — ED AVS SNAPSHOT
[facility] Emergency Department  [address] [address]  Phone:  [phone]  Fax:  [phone]                                    Nasim Montez   MRN: [id number]    Department:  [facility] Emergency Department   Date of Visit:  4/28/2020           After Visit Summary Signature Page    I have received my discharge instructions, and my questions have been answered. I have discussed any challenges I see with this plan with the nurse or doctor.    ..........................................................................................................................................  Patient/Patient Representative Signature      ..........................................................................................................................................  Patient Representative Print Name and Relationship to Patient    ..................................................               ................................................  Date                                   Time    ..........................................................................................................................................  Reviewed by Signature/Title    ...................................................              ..............................................  Date                                               Time          22EPIC Rev 08/18

## 2020-04-29 VITALS
BODY MASS INDEX: 32.51 KG/M2 | DIASTOLIC BLOOD PRESSURE: 78 MMHG | SYSTOLIC BLOOD PRESSURE: 114 MMHG | HEIGHT: 72 IN | HEART RATE: 67 BPM | RESPIRATION RATE: 9 BRPM | WEIGHT: 240 LBS | OXYGEN SATURATION: 95 %

## 2020-04-29 LAB — TROPONIN I SERPL-MCNC: 0.02 UG/L (ref 0–0.04)

## 2020-04-29 PROCEDURE — 84484 ASSAY OF TROPONIN QUANT: CPT | Mod: 91 | Performed by: EMERGENCY MEDICINE

## 2020-04-29 PROCEDURE — 84484 ASSAY OF TROPONIN QUANT: CPT | Performed by: EMERGENCY MEDICINE

## 2020-04-29 NOTE — DISCHARGE INSTRUCTIONS
Return to the emergency department if you have worsening symptoms, difficulty breathing, repeated vomiting, or other concerns.  Otherwise follow-up in clinic for a recheck in the next 1 to 2 weeks.

## 2020-04-29 NOTE — ED PROVIDER NOTES
History     Chief Complaint   Patient presents with     Chest Pain     HPI  Nasim Montez is a 60 year old male who presents for chest pain.  The patient reports that chest pain started about 45 minutes prior to his arrival here.  Pain is described as sharp, located in the left side of his chest, radiates to his left jaw, rated as moderate to severe.  He has not taking thing for this.  Is accompanied by shortness of breath and nausea.  He denies cough, vomiting, fever, chills, diaphoresis, sore throat, runny nose, abdominal pain, diarrhea.  No bloody stools.  No rash.  No pain or swelling in the calves.  He has never had pain like this before.    Allergies:  No Known Allergies    Problem List:    There are no active problems to display for this patient.       Past Medical History:    No past medical history on file.    Past Surgical History:    No past surgical history on file.    Family History:    No family history on file.    Social History:  Marital Status:  Single [1]  Social History     Tobacco Use     Smoking status: Not on file   Substance Use Topics     Alcohol use: Not on file     Drug use: Not on file        Medications:    CLONAZEPAM PO  DULOXETINE HCL PO  methylPREDNISolone (MEDROL DOSEPAK) 4 MG tablet  Multiple Vitamins-Minerals (CENTRUM SILVER) per tablet  NAPROXEN PO  RISPERIDONE PO  TRAZODONE HCL PO          Review of Systems  Pertinent positives and negatives listed in the HPI, all other systems reviewed and are negative.    Physical Exam   BP: 134/83  Pulse: 80  Heart Rate: 72  Resp: 20  Height: 182.9 cm (6')  Weight: 108.9 kg (240 lb)  SpO2: 96 %      Physical Exam  Vitals signs and nursing note reviewed.   Constitutional:       General: He is in acute distress.      Appearance: He is well-developed. He is not diaphoretic.   HENT:      Head: Normocephalic and atraumatic.      Right Ear: External ear normal.      Left Ear: External ear normal.      Nose: Nose normal.   Eyes:      General: No  scleral icterus.     Conjunctiva/sclera: Conjunctivae normal.   Neck:      Musculoskeletal: Normal range of motion.   Cardiovascular:      Rate and Rhythm: Normal rate and regular rhythm.      Pulses:           Radial pulses are 2+ on the right side and 2+ on the left side.        Posterior tibial pulses are 2+ on the right side and 2+ on the left side.   Pulmonary:      Effort: Pulmonary effort is normal. No respiratory distress.      Breath sounds: No stridor.   Abdominal:      General: There is no distension.      Palpations: Abdomen is soft.   Skin:     General: Skin is warm and dry.   Neurological:      Mental Status: He is alert and oriented to person, place, and time.   Psychiatric:         Behavior: Behavior normal.         ED Course        Procedures               EKG Interpretation:      Interpreted by Kobe Cuevas MD  Time reviewed: 2258  Symptoms at time of EKG: Chest pain   Rhythm: normal sinus   Rate: normal  Axis: normal  Ectopy: none  Conduction: normal  ST Segments/ T Waves: No ST-T wave changes  Q Waves: none  Comparison to prior: Unchanged    Clinical Impression: normal EKG    Critical Care time:  none               Results for orders placed or performed during the hospital encounter of 04/28/20 (from the past 24 hour(s))   Basic metabolic panel   Result Value Ref Range    Sodium 140 133 - 144 mmol/L    Potassium 3.5 3.4 - 5.3 mmol/L    Chloride 108 94 - 109 mmol/L    Carbon Dioxide 25 20 - 32 mmol/L    Anion Gap 7 3 - 14 mmol/L    Glucose 132 (H) 70 - 99 mg/dL    Urea Nitrogen 10 7 - 30 mg/dL    Creatinine 0.95 0.66 - 1.25 mg/dL    GFR Estimate 86 >60 mL/min/[1.73_m2]    GFR Estimate If Black >90 >60 mL/min/[1.73_m2]    Calcium 8.4 (L) 8.5 - 10.1 mg/dL   CBC with platelets differential   Result Value Ref Range    WBC 7.0 4.0 - 11.0 10e9/L    RBC Count 4.42 4.4 - 5.9 10e12/L    Hemoglobin 14.2 13.3 - 17.7 g/dL    Hematocrit 41.1 40.0 - 53.0 %    MCV 93 78 - 100 fl    MCH 32.1 26.5 - 33.0  pg    MCHC 34.5 31.5 - 36.5 g/dL    RDW 11.9 10.0 - 15.0 %    Platelet Count 210 150 - 450 10e9/L    Diff Method Automated Method     % Neutrophils 49.5 %    % Lymphocytes 36.2 %    % Monocytes 7.2 %    % Eosinophils 6.3 %    % Basophils 0.4 %    % Immature Granulocytes 0.4 %    Nucleated RBCs 0 0 /100    Absolute Neutrophil 3.5 1.6 - 8.3 10e9/L    Absolute Lymphocytes 2.5 0.8 - 5.3 10e9/L    Absolute Monocytes 0.5 0.0 - 1.3 10e9/L    Absolute Eosinophils 0.4 0.0 - 0.7 10e9/L    Absolute Basophils 0.0 0.0 - 0.2 10e9/L    Abs Immature Granulocytes 0.0 0 - 0.4 10e9/L    Absolute Nucleated RBC 0.0    Troponin I   Result Value Ref Range    Troponin I ES 0.024 0.000 - 0.045 ug/L   XR Chest 2 Views    Narrative    EXAM: XR CHEST 2 VW  LOCATION: [facility]  DATE/TIME: 4/28/2020 11:49 PM    INDICATION: Chest pain.    COMPARISON: None.    FINDINGS: The heart size is normal. The lungs are clear. Probable trace bilateral pleural effusions. No pneumothorax. Degenerative disease in the spine.      Impression    IMPRESSION: Trace bilateral pleural effusions.   Troponin I   Result Value Ref Range    Troponin I ES 0.024 0.000 - 0.045 ug/L       Medications   nitroGLYcerin (NITROSTAT) sublingual tablet 0.4 mg (0.4 mg Sublingual Given 4/28/20 1556)   aspirin (ASA) chewable tablet 324 mg (324 mg Oral Given 4/28/20 2308)   lidocaine (XYLOCAINE) 2 % 15 mL, alum & mag hydroxide-simethicone (MAALOX  ES) 15 mL GI Cocktail (30 mLs Oral Given 4/28/20 2308)       Assessments & Plan (with Medical Decision Making)   60-year-old male presents with chest pain.  EKG is sinus rhythm without signs of any ischemia or dysrhythmia, unchanged from prior.  Heart rate is 68.  SPO2 is 97% on room air, blood pressure is 126/72.  He is given aspirin, GI cocktail, and nitroglycerin with improvement in his symptoms.  Repeat EKG is stable.  Troponin is normal at 0.024.  Hemoglobin is normal, no signs of anemia causing symptoms.  Electrolytes are  within normal limits.  Symptoms do not seem suggestive of pulmonary embolism or aortic dissection and no further work-up is pursued for this.  Chest x-ray obtained, images reviewed independently as well as radiology read reviewed, no signs of pneumothorax, widened mediastinum, or pneumonia.  Repeat troponin is also normal making ACS unlikely at this time.  At this point he is safe to discharge home with instructions to return if he has worsening symptoms or other concerns, otherwise follow-up in clinic in the next 1 to 2 weeks.  The patient is in agreement with this plan.    I have reviewed the nursing notes.    I have reviewed the findings, diagnosis, plan and need for follow up with the patient.       New Prescriptions    No medications on file       Final diagnoses:   Acute chest pain       4/28/2020   [facility] EMERGENCY DEPARTMENT     Kobe Cuevas MD  04/29/20 0249

## 2020-04-29 NOTE — ED TRIAGE NOTES
Pt c/o left side chest pain radiating into left neck, feels SOB and nauseated. Started suddenly at 2215 after rolling onto left side in bed. Pt states pain is sharp, hasn't gotten better since it started. C/o HA. No sweats, no cough, no fevers or body aches.

## 2020-09-28 ENCOUNTER — HOSPITAL ENCOUNTER (EMERGENCY)
Facility: CLINIC | Age: 60
Discharge: HOME | End: 2020-09-28
Attending: EMERGENCY MEDICINE | Admitting: EMERGENCY MEDICINE
Payer: COMMERCIAL

## 2020-09-28 VITALS
OXYGEN SATURATION: 97 % | WEIGHT: 245 LBS | TEMPERATURE: 96 F | BODY MASS INDEX: 33.18 KG/M2 | HEIGHT: 72 IN | RESPIRATION RATE: 15 BRPM | SYSTOLIC BLOOD PRESSURE: 134 MMHG | DIASTOLIC BLOOD PRESSURE: 92 MMHG | HEART RATE: 73 BPM

## 2020-09-28 DIAGNOSIS — R51.9 ACUTE NONINTRACTABLE HEADACHE, UNSPECIFIED HEADACHE TYPE: ICD-10-CM

## 2020-09-28 PROCEDURE — 93010 ELECTROCARDIOGRAM REPORT: CPT | Mod: Z6 | Performed by: EMERGENCY MEDICINE

## 2020-09-28 PROCEDURE — 96361 HYDRATE IV INFUSION ADD-ON: CPT | Performed by: EMERGENCY MEDICINE

## 2020-09-28 PROCEDURE — 25000128 H RX IP 250 OP 636: Performed by: EMERGENCY MEDICINE

## 2020-09-28 PROCEDURE — 25800030 ZZH RX IP 258 OP 636: Performed by: EMERGENCY MEDICINE

## 2020-09-28 PROCEDURE — 93005 ELECTROCARDIOGRAM TRACING: CPT | Performed by: EMERGENCY MEDICINE

## 2020-09-28 PROCEDURE — 99284 EMERGENCY DEPT VISIT MOD MDM: CPT | Mod: 25 | Performed by: EMERGENCY MEDICINE

## 2020-09-28 PROCEDURE — 96374 THER/PROPH/DIAG INJ IV PUSH: CPT | Performed by: EMERGENCY MEDICINE

## 2020-09-28 RX ORDER — DROPERIDOL 2.5 MG/ML
0.62 INJECTION, SOLUTION INTRAMUSCULAR; INTRAVENOUS ONCE
Status: DISCONTINUED | OUTPATIENT
Start: 2020-09-28 | End: 2020-09-28

## 2020-09-28 RX ORDER — DROPERIDOL 2.5 MG/ML
2.5 INJECTION, SOLUTION INTRAMUSCULAR; INTRAVENOUS ONCE
Status: COMPLETED | OUTPATIENT
Start: 2020-09-28 | End: 2020-09-28

## 2020-09-28 RX ADMIN — DROPERIDOL 2.5 MG: 2.5 INJECTION, SOLUTION INTRAMUSCULAR; INTRAVENOUS at 04:55

## 2020-09-28 RX ADMIN — SODIUM CHLORIDE, POTASSIUM CHLORIDE, SODIUM LACTATE AND CALCIUM CHLORIDE 1000 ML: 600; 310; 30; 20 INJECTION, SOLUTION INTRAVENOUS at 04:55

## 2020-09-28 ASSESSMENT — MIFFLIN-ST. JEOR: SCORE: 1949.31

## 2020-09-28 NOTE — ED TRIAGE NOTES
Headache described as patient's typical migraine HA. Ongoing since Friday despite Tylenol and Ibuprofen administration. Reports migraines being less frequent as of recently. Nausea, vomiting, dizziness, photophobia with the migraine like HA.

## 2020-09-28 NOTE — ED NOTES
"Pt states he has a history of claustrophobia and anxiety. Pt states, \"it's not related to the medications.\" Pt requesting to leave. States his HA has improved. MD is aware.  "

## 2020-09-28 NOTE — ED PROVIDER NOTES
History     Chief Complaint   Patient presents with     Headache     HPI  Nasim Montez is a 60 year old male who presents for headache.  Pain started three days ago. Onset was not sudden, occurred over hours.  Pain localized to bilateral frontal, without radiation.  Described as throbbing, rated as moderate to severe.  Headache has been associated with nausea, vomiting, dizziness, and photophobia.  He does have a history of headaches with past similar episodes.  Does not have fever.  Does not have neck stiffness.  Has taken acetaminophen and ibuprofen without relief.  Denies chest pain, shortness of breath, abdominal pain, diarrhea, dysuria, focal weakness or paresthesias.     Allergies:  No Known Allergies    Problem List:    There are no active problems to display for this patient.       Past Medical History:    History reviewed. No pertinent past medical history.    Past Surgical History:    History reviewed. No pertinent surgical history.    Family History:    History reviewed. No pertinent family history.    Social History:  Marital Status:  Single [1]  Social History     Tobacco Use     Smoking status: Never Smoker     Smokeless tobacco: Never Used   Substance Use Topics     Alcohol use: Not Currently     Drug use: Never        Medications:    CLONAZEPAM PO  DULOXETINE HCL PO  methylPREDNISolone (MEDROL DOSEPAK) 4 MG tablet  Multiple Vitamins-Minerals (CENTRUM SILVER) per tablet  NAPROXEN PO  RISPERIDONE PO  TRAZODONE HCL PO          Review of Systems  Pertinent positives and negatives listed in the HPI, all other systems reviewed and are negative.    Physical Exam   BP: (!) 146/78  Pulse: 87  Temp: 96  F (35.6  C)  Resp: 18  Height: 182.9 cm (6')  Weight: 111.1 kg (245 lb)  SpO2: 95 %      Physical Exam  Vitals signs and nursing note reviewed.   Constitutional:       General: He is in acute distress.      Appearance: He is well-developed. He is not diaphoretic.   HENT:      Head: Normocephalic and  atraumatic.      Right Ear: External ear normal.      Left Ear: External ear normal.      Nose: Nose normal.   Eyes:      General: No scleral icterus.     Conjunctiva/sclera: Conjunctivae normal.   Neck:      Musculoskeletal: Normal range of motion.   Cardiovascular:      Rate and Rhythm: Normal rate and regular rhythm.   Pulmonary:      Effort: Pulmonary effort is normal. No respiratory distress.      Breath sounds: No stridor.   Abdominal:      General: There is no distension.      Palpations: Abdomen is soft.   Skin:     General: Skin is warm and dry.   Neurological:      Mental Status: He is alert and oriented to person, place, and time.      GCS: GCS eye subscore is 4. GCS verbal subscore is 5. GCS motor subscore is 6.      Cranial Nerves: Cranial nerves are intact. No cranial nerve deficit, dysarthria or facial asymmetry.      Motor: No tremor, atrophy or pronator drift.      Coordination: Finger-Nose-Finger Test normal. Rapid alternating movements normal.   Psychiatric:         Behavior: Behavior normal.         ED Course        Procedures               EKG Interpretation:      Interpreted by Kobe Cuevas MD  Time reviewed: 0455  Symptoms at time of EKG: None   Rhythm: normal sinus   Rate: normal  Axis: normal  Ectopy: none  Conduction: normal  ST Segments/ T Waves: No ST-T wave changes  Q Waves: none  Comparison to prior: Unchanged    Clinical Impression: normal EKG    Critical Care time:  none               No results found for this or any previous visit (from the past 24 hour(s)).    Medications   lactated ringers BOLUS 1,000 mL (1,000 mLs Intravenous New Bag 9/28/20 0455)   droperidol (INAPSINE) injection 2.5 mg (2.5 mg Intravenous Given 9/28/20 0455)       Assessments & Plan (with Medical Decision Making)   60 year old male who presents with headache.  Given his normal neurologic exam, slow onset of headache, and history of similar previous headaches, no indication for head imaging at this time  as he is low risk for subarachnoid, epidural, subdural, or intracranial mass.  Without fever, low risk for meningitis, no indication for lumbar puncture at this time.  Will treat with IV fluids and droperidol.  On recheck the patient is feeling better and feels comfortable going home.  He is discharged with instructions to return if worse, otherwise follow-up in clinic.  The patient is in agreement with this plan.    I have reviewed the nursing notes.    I have reviewed the findings, diagnosis, plan and need for follow up with the patient.       New Prescriptions    No medications on file       Final diagnoses:   Acute nonintractable headache, unspecified headache type       9/28/2020   [facility] EMERGENCY DEPARTMENT     Kobe Cuevas MD  09/28/20 0508

## 2020-09-28 NOTE — DISCHARGE INSTRUCTIONS
Return to the emergency department for fevers, worsening pain, repeated vomiting, or other concerns.  Otherwise get plenty of rest and follow-up in clinic.

## 2021-05-25 ENCOUNTER — RECORDS - HEALTHEAST (OUTPATIENT)
Dept: ADMINISTRATIVE | Facility: CLINIC | Age: 61
End: 2021-05-25

## 2021-05-30 NOTE — PROGRESS NOTES
Subjective:      Patient ID: Nasim Montez is a 59 y.o. male.    Chief Complaint:    HPI  Nasim Montez is a 59 y.o. male who is right-hand dominant presents today complaining of a work comp related injury with the date of injury of 6/25/2019 at 8:30 AM today at [facility].  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  He states that he was trying to move a fully loaded very heavy garbage can when it tipped back and he tried to stop it with the right arm.  He sustained a very large load as he was trying to flex it against the heavy garbage can. He felt and heard a very painful pop at the proximal portion of the shoulder.  Subsequently has been unable to fully ABduct, supinate or pronate or flex the shoulder secondary to pain.     He has not tried any treatment for this.  He has had no head neck back bilateral lower extremity or contralateral left upper extremity injury to report.    No past medical history on file.    No past surgical history on file.    No family history on file.    Social History     Tobacco Use     Smoking status: Never Smoker     Smokeless tobacco: Never Used   Substance Use Topics     Alcohol use: Not on file     Drug use: Not on file       Review of Systems  As above in HPI, otherwise balance of Review of Systems are negative.    Objective:     /84 (Patient Site: Left Arm, Patient Position: Sitting, Cuff Size: Adult Regular)   Pulse 69   Temp 97.6  F (36.4  C) (Oral)   Resp 22   Wt (!) 249 lb 3 oz (113 kg)   SpO2 95%     Physical Exam  General: Patient is resting comfortably no acute distress is afebrile  HEENT: Head is normocephalic atraumatic   eyes are PERRL EOMI sclera anicteric   Skin: Without rash non-diaphoretic  Musculoskeletal: Patient has no pain over the cervical thoracic lumbar sacral spinous processes.  No pain over the left or right scapular region.  He has point of maximal tenderness to palpation over the proximal portion of  the biceps tendon.  He also has pain with supination and pronation.  This is just to active range of motion.  Passive range of motion is just as painful.  He can only abduct the shoulder to 30 to 40 degrees 45 with assisted passive range of motion.  Also, flexion to 45 degrees is very painful and then limited secondary to pain.  He did not describe pain into the pectoralis muscle.  He has pain to maximal tenderness to palpation over the biceps tendon over the head of the humerus.  This does reproduce his pain.    Imaging  Xray:     Degenerative change at the glenohumeral joint. There is slight irregularity to  the superior rim of the glenoid which is most likely chronic and degenerative  and there is no significant effusion but correlation with any recent history of  injury is recommended. No evidence for dislocation. The humeral head and neck  are negative    I personally reviewed and discussed findings with the patient.  My own personal interpretation and radiologist will over read x-rays    The visit lasted a total of 25 minutes that I spent face to face with the patient out of a 35 minute office visit. Over 50% of the time was spent counseling, coordinating care, reviewing pathophysiology and treatment options and educating the patient about the management plan.      Assessment:     Procedures    The primary encounter diagnosis was Acute pain of right shoulder. A diagnosis of Injury of tendon of long head of biceps, right, initial encounter was also pertinent to this visit.    Plan:     1. Acute pain of right shoulder  XR Shoulder Right 2 or More VWS    Arm sling    CANCELED: XR Shoulder Right 2 or More VWS    CANCELED: XR Shoulder Right 2 or More VWS   2. Injury of tendon of long head of biceps, right, initial encounter  Arm sling       I am concerned that she may have a biceps tendon tear at the shoulder.  Like you to follow-up with the [facility] walk-in injury care today for definitive evaluation and  imaging.  May use a sling on the right arm for comfort.    Patient education handout from [employer] on biceps tendon tear at the shoulder for patient education and symptomatic care.

## 2021-06-03 VITALS — WEIGHT: 249.19 LBS

## 2021-06-17 NOTE — PATIENT INSTRUCTIONS - HE
Patient Instructions by Anthony Guerrier PA-C at 6/25/2019 10:50 AM     Author: Anthony Guerrier PA-C Service: -- Author Type: Physician Assistant    Filed: 6/25/2019 12:46 PM Encounter Date: 6/25/2019 Status: Addendum    : Anthony Guerrier PA-C (Physician Assistant)    Related Notes: Original Note by Anthony Guerrier PA-C (Physician Assistant) filed at 6/25/2019 12:45 PM       Keep the right arm in the sling for comfort.  Follow-up with [facility] for evaluation of a tendon tear.  [employer] [facility] handout on bicep tendon tear at the shoulder patient education and information.      Patient Education     Understanding Biceps Tendonitis    A tendon is a strong band of tissue that connects muscle to bone. The biceps muscle is in the front of the upper arm. It helps with movements such as bending the elbow or raising the arm. The upper end of the biceps muscle is called the proximal end. It has two tendons called the long head and the short head. These tendons attach the muscle to the bones in the shoulder. Biceps tendonitis occurs when either of these tendons is irritated or red and swollen (inflamed). Most cases involve the long head.  Causes of biceps tendonitis  Causes can include:    Wear and tear of the tendon from aging or normal use over time    Overuse of the tendon from sports or work activities, especially those that involve repeated overhead movements    Injury to the tendon from a fall or other accident    Other problems in the shoulder, such as shoulder impingement or a rotator cuff tear  Symptoms of biceps tendonitis  Common symptoms include:    Pain in the front of the shoulder that may also travel down the arm. The pain may be worse with activity and at night.    Swelling in the shoulder    Clicking or catching sensation when using the arm and shoulder    Trouble moving the arm and shoulder  Treating biceps tendonitis  Treatment for biceps tendonitis may include:    Resting the arm and shoulder. This involves  limiting certain movements, such as reaching above the head or raising the arm. These can slow healing and make symptoms worse. You may also need to limit certain sports and types of work for a time.    Cold therapy. This involves using items such as ice packs to help relieve symptoms. Cold can help reduce pain and swelling.    Medicines. These help relieve pain and swelling. NSAIDs (nonsteroidal anti-inflammatory drugs) are the most common medicines used. Medicines may be prescribed or bought over-the-counter. They may be given as pills. Or they may be applied to the skin in the form of a gel, cream, or patch.    Injections of medicine into the injured area. These help relieve pain and swelling for a time.    Physical therapy and exercises.  These help improve strength and range of motion in the arm and shoulder.  Possible complications    If the tendon isnt given time to heal, symptoms may worsen. Also, the tendon may tear (rupture).    If the tendon ruptures or doesnt get better with treatment, your healthcare provider may recommend surgery. This most often involves repairing and reattaching the tendon.     When to call your healthcare provider  Call your healthcare provider right away if you have any of these:    Fever of 100.4 F (38 C) or higher, or as directed    Symptoms that dont get better with treatment, or get worse    Weakness or instability in the arm or shoulder    Sudden sharp pain, bruising, swelling, popping or snapping sensation, or bulge in the upper arm or shoulder    New symptoms   Date Last Reviewed: 3/10/2016    [id number] [facility]. [address] [place] [address], [place] [address]. All rights reserved. This information is not intended as a substitute for professional medical care. Always follow your healthcare professional's instructions.

## 2022-03-07 ENCOUNTER — MEDICAL CORRESPONDENCE (OUTPATIENT)
Dept: HEALTH INFORMATION MANAGEMENT | Facility: CLINIC | Age: 62
End: 2022-03-07

## 2022-03-07 ENCOUNTER — APPOINTMENT (OUTPATIENT)
Dept: CT IMAGING | Facility: CLINIC | Age: 62
DRG: 281 | End: 2022-03-07
Attending: EMERGENCY MEDICINE
Payer: COMMERCIAL

## 2022-03-07 ENCOUNTER — HOSPITAL ENCOUNTER (INPATIENT)
Facility: CLINIC | Age: 62
LOS: 3 days | Discharge: HOME | DRG: 281 | End: 2022-03-10
Attending: EMERGENCY MEDICINE | Admitting: INTERNAL MEDICINE
Payer: COMMERCIAL

## 2022-03-07 DIAGNOSIS — I82.462 ACUTE DEEP VEIN THROMBOSIS (DVT) OF CALF MUSCLE VEIN OF LEFT LOWER EXTREMITY (H): ICD-10-CM

## 2022-03-07 DIAGNOSIS — B33.22 VIRAL MYOCARDITIS, UNSPECIFIED CHRONICITY: Primary | ICD-10-CM

## 2022-03-07 DIAGNOSIS — J02.0 ACUTE STREPTOCOCCAL PHARYNGITIS: ICD-10-CM

## 2022-03-07 DIAGNOSIS — I21.4 NSTEMI (NON-ST ELEVATED MYOCARDIAL INFARCTION) (H): ICD-10-CM

## 2022-03-07 LAB
ANION GAP SERPL CALCULATED.3IONS-SCNC: 5 MMOL/L (ref 3–14)
ATRIAL RATE - MUSE: 106 BPM
ATRIAL RATE - MUSE: 91 BPM
BASOPHILS # BLD AUTO: 0 10E3/UL (ref 0–0.2)
BASOPHILS NFR BLD AUTO: 0 %
BUN SERPL-MCNC: 9 MG/DL (ref 7–30)
CALCIUM SERPL-MCNC: 8.7 MG/DL (ref 8.5–10.1)
CHLORIDE BLD-SCNC: 101 MMOL/L (ref 94–109)
CO2 SERPL-SCNC: 25 MMOL/L (ref 20–32)
CREAT BLD-MCNC: 0.9 MG/DL (ref 0.7–1.3)
CREAT SERPL-MCNC: 0.93 MG/DL (ref 0.66–1.25)
DEPRECATED S PYO AG THROAT QL EIA: POSITIVE
DIASTOLIC BLOOD PRESSURE - MUSE: NORMAL MMHG
DIASTOLIC BLOOD PRESSURE - MUSE: NORMAL MMHG
EOSINOPHIL # BLD AUTO: 0.1 10E3/UL (ref 0–0.7)
EOSINOPHIL NFR BLD AUTO: 1 %
ERYTHROCYTE [DISTWIDTH] IN BLOOD BY AUTOMATED COUNT: 12.3 % (ref 10–15)
ERYTHROCYTE [DISTWIDTH] IN BLOOD BY AUTOMATED COUNT: 12.3 % (ref 10–15)
FLUAV RNA SPEC QL NAA+PROBE: NEGATIVE
FLUBV RNA RESP QL NAA+PROBE: NEGATIVE
GFR SERPL CREATININE-BSD FRML MDRD: >60 ML/MIN/1.73M2
GFR SERPL CREATININE-BSD FRML MDRD: >90 ML/MIN/1.73M2
GLUCOSE BLD-MCNC: 134 MG/DL (ref 70–99)
HCT VFR BLD AUTO: 38.9 % (ref 40–53)
HCT VFR BLD AUTO: 42.4 % (ref 40–53)
HGB BLD-MCNC: 13 G/DL (ref 13.3–17.7)
HGB BLD-MCNC: 14.3 G/DL (ref 13.3–17.7)
IMM GRANULOCYTES # BLD: 0.1 10E3/UL
IMM GRANULOCYTES NFR BLD: 1 %
INTERPRETATION ECG - MUSE: NORMAL
INTERPRETATION ECG - MUSE: NORMAL
LACTATE SERPL-SCNC: 1 MMOL/L (ref 0.7–2)
LYMPHOCYTES # BLD AUTO: 1.4 10E3/UL (ref 0.8–5.3)
LYMPHOCYTES NFR BLD AUTO: 10 %
MCH RBC QN AUTO: 31.6 PG (ref 26.5–33)
MCH RBC QN AUTO: 31.7 PG (ref 26.5–33)
MCHC RBC AUTO-ENTMCNC: 33.4 G/DL (ref 31.5–36.5)
MCHC RBC AUTO-ENTMCNC: 33.7 G/DL (ref 31.5–36.5)
MCV RBC AUTO: 94 FL (ref 78–100)
MCV RBC AUTO: 95 FL (ref 78–100)
MONOCYTES # BLD AUTO: 1.2 10E3/UL (ref 0–1.3)
MONOCYTES NFR BLD AUTO: 9 %
NEUTROPHILS # BLD AUTO: 11.1 10E3/UL (ref 1.6–8.3)
NEUTROPHILS NFR BLD AUTO: 79 %
NRBC # BLD AUTO: 0 10E3/UL
NRBC BLD AUTO-RTO: 0 /100
NT-PROBNP SERPL-MCNC: 505 PG/ML (ref 0–900)
P AXIS - MUSE: 52 DEGREES
P AXIS - MUSE: 58 DEGREES
PLATELET # BLD AUTO: 191 10E3/UL (ref 150–450)
PLATELET # BLD AUTO: 198 10E3/UL (ref 150–450)
POTASSIUM BLD-SCNC: 3.5 MMOL/L (ref 3.4–5.3)
PR INTERVAL - MUSE: 160 MS
PR INTERVAL - MUSE: 172 MS
QRS DURATION - MUSE: 100 MS
QRS DURATION - MUSE: 96 MS
QT - MUSE: 326 MS
QT - MUSE: 352 MS
QTC - MUSE: 432 MS
QTC - MUSE: 433 MS
R AXIS - MUSE: -4 DEGREES
R AXIS - MUSE: 27 DEGREES
RBC # BLD AUTO: 4.1 10E6/UL (ref 4.4–5.9)
RBC # BLD AUTO: 4.53 10E6/UL (ref 4.4–5.9)
SARS-COV-2 RNA RESP QL NAA+PROBE: NEGATIVE
SODIUM SERPL-SCNC: 131 MMOL/L (ref 133–144)
SYSTOLIC BLOOD PRESSURE - MUSE: NORMAL MMHG
SYSTOLIC BLOOD PRESSURE - MUSE: NORMAL MMHG
T AXIS - MUSE: 43 DEGREES
T AXIS - MUSE: 55 DEGREES
TROPONIN I SERPL HS-MCNC: 2660 NG/L
VENTRICULAR RATE- MUSE: 106 BPM
VENTRICULAR RATE- MUSE: 91 BPM
WBC # BLD AUTO: 10.7 10E3/UL (ref 4–11)
WBC # BLD AUTO: 14 10E3/UL (ref 4–11)

## 2022-03-07 PROCEDURE — 85025 COMPLETE CBC W/AUTO DIFF WBC: CPT | Performed by: EMERGENCY MEDICINE

## 2022-03-07 PROCEDURE — 85520 HEPARIN ASSAY: CPT | Performed by: INTERNAL MEDICINE

## 2022-03-07 PROCEDURE — 93005 ELECTROCARDIOGRAM TRACING: CPT

## 2022-03-07 PROCEDURE — 250N000011 HC RX IP 250 OP 636: Performed by: EMERGENCY MEDICINE

## 2022-03-07 PROCEDURE — 96365 THER/PROPH/DIAG IV INF INIT: CPT | Mod: 59

## 2022-03-07 PROCEDURE — 36415 COLL VENOUS BLD VENIPUNCTURE: CPT | Performed by: INTERNAL MEDICINE

## 2022-03-07 PROCEDURE — 83880 ASSAY OF NATRIURETIC PEPTIDE: CPT | Performed by: EMERGENCY MEDICINE

## 2022-03-07 PROCEDURE — 85027 COMPLETE CBC AUTOMATED: CPT | Performed by: INTERNAL MEDICINE

## 2022-03-07 PROCEDURE — 96376 TX/PRO/DX INJ SAME DRUG ADON: CPT

## 2022-03-07 PROCEDURE — 36415 COLL VENOUS BLD VENIPUNCTURE: CPT | Performed by: EMERGENCY MEDICINE

## 2022-03-07 PROCEDURE — 71275 CT ANGIOGRAPHY CHEST: CPT

## 2022-03-07 PROCEDURE — 82565 ASSAY OF CREATININE: CPT

## 2022-03-07 PROCEDURE — 84484 ASSAY OF TROPONIN QUANT: CPT | Performed by: EMERGENCY MEDICINE

## 2022-03-07 PROCEDURE — 87040 BLOOD CULTURE FOR BACTERIA: CPT | Performed by: EMERGENCY MEDICINE

## 2022-03-07 PROCEDURE — 70450 CT HEAD/BRAIN W/O DYE: CPT

## 2022-03-07 PROCEDURE — 83605 ASSAY OF LACTIC ACID: CPT | Performed by: EMERGENCY MEDICINE

## 2022-03-07 PROCEDURE — 250N000011 HC RX IP 250 OP 636

## 2022-03-07 PROCEDURE — 250N000013 HC RX MED GY IP 250 OP 250 PS 637: Performed by: INTERNAL MEDICINE

## 2022-03-07 PROCEDURE — 210N000001 HC R&B IMCU HEART CARE

## 2022-03-07 PROCEDURE — 250N000013 HC RX MED GY IP 250 OP 250 PS 637: Performed by: EMERGENCY MEDICINE

## 2022-03-07 PROCEDURE — 87636 SARSCOV2 & INF A&B AMP PRB: CPT | Performed by: EMERGENCY MEDICINE

## 2022-03-07 PROCEDURE — 82310 ASSAY OF CALCIUM: CPT | Performed by: EMERGENCY MEDICINE

## 2022-03-07 PROCEDURE — 99285 EMERGENCY DEPT VISIT HI MDM: CPT | Mod: 25

## 2022-03-07 PROCEDURE — 84484 ASSAY OF TROPONIN QUANT: CPT | Mod: 91 | Performed by: INTERNAL MEDICINE

## 2022-03-07 PROCEDURE — 93005 ELECTROCARDIOGRAM TRACING: CPT | Mod: 76

## 2022-03-07 PROCEDURE — 96366 THER/PROPH/DIAG IV INF ADDON: CPT

## 2022-03-07 PROCEDURE — 99223 1ST HOSP IP/OBS HIGH 75: CPT | Mod: AI | Performed by: INTERNAL MEDICINE

## 2022-03-07 PROCEDURE — C9803 HOPD COVID-19 SPEC COLLECT: HCPCS

## 2022-03-07 PROCEDURE — 250N000009 HC RX 250: Performed by: EMERGENCY MEDICINE

## 2022-03-07 PROCEDURE — 210N000002 HC R&B HEART CARE

## 2022-03-07 PROCEDURE — 87880 STREP A ASSAY W/OPTIC: CPT | Performed by: EMERGENCY MEDICINE

## 2022-03-07 RX ORDER — CLONAZEPAM 0.5 MG/1
0.5 TABLET ORAL AT BEDTIME
Status: DISCONTINUED | OUTPATIENT
Start: 2022-03-08 | End: 2022-03-10 | Stop reason: HOSPADM

## 2022-03-07 RX ORDER — CEPHALEXIN 500 MG/1
500 CAPSULE ORAL EVERY 8 HOURS SCHEDULED
Status: DISCONTINUED | OUTPATIENT
Start: 2022-03-07 | End: 2022-03-10 | Stop reason: HOSPADM

## 2022-03-07 RX ORDER — ONDANSETRON 4 MG/1
4 TABLET, ORALLY DISINTEGRATING ORAL EVERY 6 HOURS PRN
Status: DISCONTINUED | OUTPATIENT
Start: 2022-03-07 | End: 2022-03-10 | Stop reason: HOSPADM

## 2022-03-07 RX ORDER — CELECOXIB 200 MG/1
200 CAPSULE ORAL 2 TIMES DAILY PRN
COMMUNITY

## 2022-03-07 RX ORDER — ASPIRIN 81 MG/1
324 TABLET, CHEWABLE ORAL ONCE
Status: DISCONTINUED | OUTPATIENT
Start: 2022-03-07 | End: 2022-03-07

## 2022-03-07 RX ORDER — ACETAMINOPHEN 500 MG
500-1000 TABLET ORAL EVERY 6 HOURS PRN
COMMUNITY

## 2022-03-07 RX ORDER — TRAZODONE HYDROCHLORIDE 50 MG/1
200 TABLET, FILM COATED ORAL
Status: DISCONTINUED | OUTPATIENT
Start: 2022-03-07 | End: 2022-03-10 | Stop reason: HOSPADM

## 2022-03-07 RX ORDER — LIDOCAINE 40 MG/G
CREAM TOPICAL
Status: DISCONTINUED | OUTPATIENT
Start: 2022-03-07 | End: 2022-03-08

## 2022-03-07 RX ORDER — NALOXONE HYDROCHLORIDE 0.4 MG/ML
0.4 INJECTION, SOLUTION INTRAMUSCULAR; INTRAVENOUS; SUBCUTANEOUS
Status: DISCONTINUED | OUTPATIENT
Start: 2022-03-07 | End: 2022-03-10 | Stop reason: HOSPADM

## 2022-03-07 RX ORDER — SIMVASTATIN 40 MG
20 TABLET ORAL AT BEDTIME
COMMUNITY

## 2022-03-07 RX ORDER — ASPIRIN 81 MG/1
81 TABLET, CHEWABLE ORAL DAILY
Status: DISCONTINUED | OUTPATIENT
Start: 2022-03-08 | End: 2022-03-09

## 2022-03-07 RX ORDER — METOPROLOL TARTRATE 25 MG/1
25 TABLET, FILM COATED ORAL 2 TIMES DAILY
Status: DISCONTINUED | OUTPATIENT
Start: 2022-03-07 | End: 2022-03-10 | Stop reason: HOSPADM

## 2022-03-07 RX ORDER — LIDOCAINE 40 MG/G
CREAM TOPICAL
Status: DISCONTINUED | OUTPATIENT
Start: 2022-03-07 | End: 2022-03-10 | Stop reason: HOSPADM

## 2022-03-07 RX ORDER — ONDANSETRON 8 MG/1
8 TABLET, ORALLY DISINTEGRATING ORAL EVERY 8 HOURS PRN
COMMUNITY

## 2022-03-07 RX ORDER — IOPAMIDOL 755 MG/ML
79 INJECTION, SOLUTION INTRAVASCULAR ONCE
Status: COMPLETED | OUTPATIENT
Start: 2022-03-07 | End: 2022-03-07

## 2022-03-07 RX ORDER — ASPIRIN 81 MG/1
324 TABLET, CHEWABLE ORAL ONCE
Status: COMPLETED | OUTPATIENT
Start: 2022-03-07 | End: 2022-03-07

## 2022-03-07 RX ORDER — OXYCODONE HYDROCHLORIDE 5 MG/1
5 CAPSULE ORAL EVERY 4 HOURS PRN
COMMUNITY

## 2022-03-07 RX ORDER — RISPERIDONE 0.5 MG/1
0.5 TABLET ORAL 2 TIMES DAILY
COMMUNITY

## 2022-03-07 RX ORDER — NORTRIPTYLINE HCL 25 MG
25 CAPSULE ORAL AT BEDTIME
COMMUNITY

## 2022-03-07 RX ORDER — GABAPENTIN 100 MG/1
300 CAPSULE ORAL 3 TIMES DAILY PRN
COMMUNITY

## 2022-03-07 RX ORDER — ONDANSETRON 2 MG/ML
4 INJECTION INTRAMUSCULAR; INTRAVENOUS EVERY 6 HOURS PRN
Status: DISCONTINUED | OUTPATIENT
Start: 2022-03-07 | End: 2022-03-10 | Stop reason: HOSPADM

## 2022-03-07 RX ORDER — HEPARIN SODIUM 10000 [USP'U]/100ML
0-5000 INJECTION, SOLUTION INTRAVENOUS CONTINUOUS
Status: DISCONTINUED | OUTPATIENT
Start: 2022-03-07 | End: 2022-03-09

## 2022-03-07 RX ORDER — HEPARIN SODIUM 10000 [USP'U]/100ML
0-5000 INJECTION, SOLUTION INTRAVENOUS CONTINUOUS
Status: DISCONTINUED | OUTPATIENT
Start: 2022-03-07 | End: 2022-03-07

## 2022-03-07 RX ORDER — ATORVASTATIN CALCIUM 40 MG/1
40 TABLET, FILM COATED ORAL DAILY
Status: DISCONTINUED | OUTPATIENT
Start: 2022-03-08 | End: 2022-03-09

## 2022-03-07 RX ORDER — TAMSULOSIN HYDROCHLORIDE 0.4 MG/1
0.4 CAPSULE ORAL DAILY
COMMUNITY

## 2022-03-07 RX ORDER — MORPHINE SULFATE 2 MG/ML
1 INJECTION, SOLUTION INTRAMUSCULAR; INTRAVENOUS
Status: DISCONTINUED | OUTPATIENT
Start: 2022-03-07 | End: 2022-03-10 | Stop reason: HOSPADM

## 2022-03-07 RX ORDER — NALOXONE HYDROCHLORIDE 0.4 MG/ML
0.2 INJECTION, SOLUTION INTRAMUSCULAR; INTRAVENOUS; SUBCUTANEOUS
Status: DISCONTINUED | OUTPATIENT
Start: 2022-03-07 | End: 2022-03-10 | Stop reason: HOSPADM

## 2022-03-07 RX ORDER — ASPIRIN 81 MG/1
81 TABLET ORAL DAILY
Status: ON HOLD | COMMUNITY
End: 2022-03-10

## 2022-03-07 RX ORDER — NITROGLYCERIN 0.4 MG/1
0.4 TABLET SUBLINGUAL EVERY 5 MIN PRN
Status: DISCONTINUED | OUTPATIENT
Start: 2022-03-07 | End: 2022-03-10 | Stop reason: HOSPADM

## 2022-03-07 RX ORDER — OXYCODONE HYDROCHLORIDE 5 MG/1
5 TABLET ORAL ONCE
Status: COMPLETED | OUTPATIENT
Start: 2022-03-07 | End: 2022-03-07

## 2022-03-07 RX ADMIN — ASPIRIN 81 MG CHEWABLE TABLET 324 MG: 81 TABLET CHEWABLE at 16:42

## 2022-03-07 RX ADMIN — SODIUM CHLORIDE 100 ML: 9 INJECTION, SOLUTION INTRAVENOUS at 15:30

## 2022-03-07 RX ADMIN — IOPAMIDOL 79 ML: 755 INJECTION, SOLUTION INTRAVENOUS at 15:30

## 2022-03-07 RX ADMIN — OXYCODONE HYDROCHLORIDE 5 MG: 5 TABLET ORAL at 19:47

## 2022-03-07 RX ADMIN — CEPHALEXIN 500 MG: 500 CAPSULE ORAL at 22:58

## 2022-03-07 RX ADMIN — METOPROLOL TARTRATE 25 MG: 25 TABLET, FILM COATED ORAL at 22:58

## 2022-03-07 RX ADMIN — HEPARIN SODIUM 1800 UNITS/HR: 10000 INJECTION, SOLUTION INTRAVENOUS at 16:45

## 2022-03-07 RX ADMIN — PENICILLIN G BENZATHINE 1.2 MILLION UNITS: 1200000 INJECTION, SUSPENSION INTRAMUSCULAR at 19:49

## 2022-03-07 ASSESSMENT — ENCOUNTER SYMPTOMS
MYALGIAS: 1
SHORTNESS OF BREATH: 1

## 2022-03-07 ASSESSMENT — ACTIVITIES OF DAILY LIVING (ADL)
ADLS_ACUITY_SCORE: 12

## 2022-03-07 NOTE — ED PROVIDER NOTES
History   Chief Complaint:  Shortness of Breath      The history is provided by the patient.      Nasim Montez is a 62 year old male who presents with shortness of breath and DVT diagnosed on 3/4. On Friday he developed muscle aches, a headache, neck pressure and short of breath and had left leg pain. [place] [facility] [place]. He was prescribed with Lovenox and Coumadin but has not been able to get those medications.  He was also told that he had an infection around the wound in his ankle.  He went to see Dr. Cox his foot and ankle surgeon for a checkup today.  He had surgery on the ankle approximately 6 weeks ago.  He was sent to the emergency department.  Nasim has been short of breath all weekend.  He has had some intermittent chest tightness with radiation up his neck.  He is also concerned he has strep throat.  He reports that he had negative Covid test on Friday.  He denies having any other medical problems and is not taking blood thinners. Nasim has been feeling like he is going to lose consciousness.     Review of Systems   Respiratory: Positive for shortness of breath.    Cardiovascular:        Leg pain   Musculoskeletal: Positive for myalgias.   Neurological: Negative for syncope.   All other systems reviewed and are negative.    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   PTSD  Hyperlipidemia   Obesity  BPH  Anxiety     Social History:  Presents alone  [facility]P: [facility]    Physical Exam     Patient Vitals for the past 24 hrs:   BP Temp Temp src Pulse Resp SpO2 Height Weight   03/07/22 1900 111/74 -- -- 90 18 96 % -- --   03/07/22 1829 -- -- -- 90 10 95 % -- --   03/07/22 1547 -- -- -- 101 26 97 % -- --   03/07/22 1439 128/81 98  F (36.7  C) Oral 110 30 98 % 1.829 m (6') 108.9 kg (240 lb)        Physical Exam  General: Well-nourished, appears to be resting comfortably when I enter the room  Eyes: PERRL, conjunctivae pink no scleral icterus or conjunctival injection  ENT:  Moist mucus membranes, posterior oropharynx mildly erythematous without edema or exudates, uvula midline, no stridor or trismus, neck supple.  +shotty anterior lymphadenopathy.   Respiratory:  Lungs clear to auscultation bilaterally, no crackles/rubs/wheezes.  Good air movement  CV: Mildly tachycardic rate and regular rhythm, no murmurs/rubs/gallops  GI:  Abdomen soft and non-distended.  Normoactive BS.  No tenderness, guarding or rebound  Skin: Warm, dry.  No rashes or petechiae  Musculoskeletal: Mild left calf tenderness.  Incisions over the left ankle appear clean dry and intact without signs of infection.  Neuro: Alert and oriented to person/place/time  Psychiatric: Normal affect      Emergency Department Course   ECG  ECG obtained at 1448, ECG read at 1511  Sinus tachycardia  Possible Anterior infarct, age undetermined  Abnormal ECG    No significant changes as compared to prior, dated 9/28/2020.  Rate 106 bpm. MI interval 160 ms. QRS duration 96 ms. QT/QTc 326/433 ms. P-R-T axes 58 27 55.     ECG  ECG obtained at 1745, ECG read at 1815  Normal sinus rhythm  Normal ECG   No significant changes as compared to prior, dated 9/28/2020.  Rate 91 bpm. MI interval 172 ms. QRS duration 100 ms. QT/QTc 352/432 ms. P-R-T axes 52 -4 43.     Imaging:  CT Chest Pulmonary Embolism w Contrast   Final Result   IMPRESSION:   1.  No evidence for pulmonary embolism.   2.  Trace amount of pleural fluid bilaterally.   3.  There are two indeterminate pulmonary nodules in the right lung,   with the largest measuring 0.6 cm. Please refer to pulmonary nodule   follow-up guidelines below.      Recommendations for an incidental lung nodule = or > 6mm to 8mm:     Low risk patients: Initial follow-up CT at 6-12 months, then   consider CT at 18-24 months if  no change.     High risk patients: Initial follow-up CT at 6-12 months, then CT at   18-24 months if no change.      *Low Risk: Minimal or absent history of smoking or other known risk   factors.   *Nonsolid (ground-glass) or partly solid nodules may require longer   follow-up to exclude indolent adenocarcinoma.   *Recommendations based on Guidelines for the Management of Incidental   Pulmonary Nodules Detected at CT: From the Fleischner Society 2017,   Radiology 2017.   *Guidelines apply to incidental nodules in patients who are 35 years   or older.   *Guidelines do not apply to lung cancer screening, patients with   immunosuppression, or patients with known primary cancer.      LIZETTE WALTON MD            SYSTEM ID:  [id number]      Head CT w/o contrast   Final Result   IMPRESSION:   1.  No evidence of acute intracranial hemorrhage or mass effect.      ARYA VILLEGAS MD            SYSTEM ID:  [id number]        Report per radiology    Laboratory:  Labs Ordered and Resulted from Time of ED Arrival to Time of ED Departure   BASIC METABOLIC PANEL - Abnormal       Result Value    Sodium 131 (*)     Potassium 3.5      Chloride 101      Carbon Dioxide (CO2) 25      Anion Gap 5      Urea Nitrogen 9      Creatinine 0.93      Calcium 8.7      Glucose 134 (*)     GFR Estimate >90     TROPONIN I - Abnormal    Troponin I High Sensitivity 2,660 (*)    CBC WITH PLATELETS AND DIFFERENTIAL - Abnormal    WBC Count 14.0 (*)     RBC Count 4.53      Hemoglobin 14.3      Hematocrit 42.4      MCV 94      MCH 31.6      MCHC 33.7      RDW 12.3      Platelet Count 198      % Neutrophils 79      % Lymphocytes 10      % Monocytes 9      % Eosinophils 1      % Basophils 0      % Immature Granulocytes 1      NRBCs per 100 WBC 0      Absolute Neutrophils 11.1 (*)     Absolute Lymphocytes 1.4      Absolute Monocytes 1.2      Absolute Eosinophils 0.1      Absolute Basophils 0.0      Absolute Immature Granulocytes 0.1      Absolute NRBCs 0.0      STREPTOCOCCUS A RAPID SCREEN W REFELX TO PCR - Abnormal    Group A Strep antigen Positive (*)    NT PROBNP INPATIENT - Normal    N terminal Pro BNP Inpatient 505     LACTIC ACID WHOLE BLOOD - Normal    Lactic Acid 1.0     INFLUENZA A/B & SARS-COV2 PCR MULTIPLEX - Normal    Influenza A PCR Negative      Influenza B PCR Negative      SARS CoV2 PCR Negative     ISTAT CREATININE POCT - Normal    Creatinine POCT 0.9      GFR, ESTIMATED POCT >60     BLOOD CULTURE   BLOOD CULTURE      Emergency Department Course:    Reviewed:  I reviewed nursing notes    Assessments:  1509 I obtained history and examined the patient as noted above.   I rechecked the patient and explained findings.       Consults:  1650 I spoke with Ayden Holcomb of the hospitalist service who is in agreement to accept the patient for admission.   I consulted with the pharmacist regarding IV versus IM dosing of penicillin.    Interventions:  Medications   lidocaine 1 % 0.1-1 mL (has no administration in time range)   lidocaine (LMX4) cream (has no administration in time range)   sodium chloride (PF) 0.9% PF flush 3 mL (has no administration in time range)   sodium chloride (PF) 0.9% PF flush 3 mL (has no administration in time range)   heparin infusion 25,000 units in D5W 250 mL ANTICOAGULANT (1,800 Units/hr Intravenous Rate/Dose Verify 3/7/22 1950)   iopamidol (ISOVUE-370) solution 79 mL (79 mLs Intravenous Given 3/7/22 1530)   100mL Saline Flush (100 mLs Intravenous Given 3/7/22 1530)   heparin ANTICOAGULANT loading dose for  HIGH INTENSITY TREATMENT* Give BEFORE starting heparin infusion (8,000 Units Intravenous Given 3/7/22 1645)   aspirin (ASA) chewable tablet 324 mg (324 mg Oral Given 3/7/22 1642)   oxyCODONE (ROXICODONE) tablet 5 mg (5 mg Oral Given 3/7/22 1947)   penicillin G benzathine (BICILLIN L-A) injection 1.2 Million Units (1.2 Million Units Intramuscular Given 3/7/22 1949)     Disposition:  The patient was admitted to the hospital  under the care of Dr. Benites.      Impression & Plan     Medical Decision Making:  Nasim Montez is a 62 year old male who comes with multiple complaints.  Of concern, he was diagnosed with a DVT after ankle surgery 6 weeks ago.  The DVT was diagnosed several days ago and he has not yet started his anticoagulation.  He comes today with chest tightness and shortness of breath along with near syncope and tachycardia.  This was very concerning for the possibility of a pulmonary embolism.  Additionally, he has influenza-like symptoms with report of chills and a sore throat with muscle aches.  He had tested negative for Covid on Friday.  He also complained of a headache associated with this.  Given my belief that it was likely he would be started on anticoagulation, head CT was obtained.  No signs of intracranial hemorrhage or other significant abnormality.  He has a supple neck.  No signs of meningitis.  The wound on his ankle does not actually appear to be cellulitic or infected to me.  CT scan did not show any signs of pneumonia.  Surprisingly, there was no signs of pulmonary embolism.  His laboratory studies showed a normal BNP but a very significantly elevated troponin.  His EKG was tachycardic but without acute ischemic changes.  His repeat EKG was improved.  He does report, on further discussion, that he has had some episodes of chest tightness and associated shortness of breath.  He is not having chest tightness at the current time.  He was concerned about the possibility of strep throat.  His strep swab is indeed positive.  It seems unusual that he would have strep throat as well as an NSTEMI but not outside the realm of possibility.  I doubt that this is subacute bacterial endocarditis or myocarditis but cultures were obtained.  The patient was treated with IM penicillin.  He was started on a heparin drip for the NSTEMI.  He is chest pain-free at this time.  He will be admitted to the hospital for  further evaluation and treatment.  He was treated with oxycodone for his ankle pain.    Critical Care Time: was 45 minutes for this patient excluding procedures    Diagnosis:    ICD-10-CM    1. NSTEMI (non-ST elevated myocardial infarction) (H)  I21.4    2. Acute deep vein thrombosis (DVT) of calf muscle vein of left lower extremity (H)  I82.462    3. Acute streptococcal pharyngitis  J02.0        Discharge Medications:  Current Discharge Medication List          Scribe Disclosure:  Dori MCLEOD, am serving as a scribe at 3:09 PM on 3/7/2022 to document services personally performed by Mili Wright MD based on my observations and the provider's statements to me.            Mili Wright MD  03/07/22 8948

## 2022-03-07 NOTE — H&P
[facility]    History and Physical  Hospitalist       Date of Admission:  3/7/2022    Assessment & Plan   Nasim Montez is a 62 year old male who presents with chest pain and shortness of breath.    Non-ST elevation MI  -Patient presents with concerning symptoms of chest pain and dyspnea  -Troponin elevated at 2660  -CT chest negative for PE  -Continue high intensity heparin drip(has a concomitant DVT)  -Trend troponin  -Echocardiogram in the morning  -Aspirin 81 mg daily  -Lipitor 40 mg daily  -Start low-dose beta-blocker  -Cardiology consult    Left posterior tibial veins DVT  -This was diagnosed at [facility] on 3/5/2022  -Patient was prescribed Lovenox/warfarin and discharged from the emergency room, however he was not able to fill it due to cost  -Continue heparin drip.    -Social work/care transition and pharmacy liaison consult for appropriate anticoagulation(at discharge)    ?  Left lower extremity cellulitis.   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  -Patient underwent extensive above procedure at [facility] on 1/18/2022  -Patient was also found to have left lower extremity cellulitis during the ER visit on 3/5 and was discharged on Keflex  -Patient apparently did not fill his Keflex either  -Patient has minimal erythema however he does have some leukocytosis  -Recommend Keflex for a short course for about 5 days.    Indeterminate right pulmonary nodules   -CT chest revealed 2 pulmonary nodules, largest one at 0.6 cm. Follow-up with CT in 6 to 12 months.    Mild hyponatremia  -Recheck labs tomorrow    BPH  -Continue prior to admission Flomax    PTSD  Anxiety  -Resume PTA Cymbalta, nortriptyline, Klonopin, Risperdal and trazodone once  verified    Nasim is a LOW SUSPICION PUI.  Follow these instructions:    If COVID test positive -> continue isolation precautions    If COVID test negative -> discontinue COVID-specific isolation precautions     DVT Prophylaxis: Heparin gtt  Code Status: Full Code    Disposition: Expected discharge in 2 + days    Ayden Srivastava MD    Primary Care Physician   [facility]    Chief Complaint   Chest pain and shortness of breath    History is obtained from the patient    History of Present Illness   Nasim Montez is a 62 year old male who presents with chest pain and shortness of breath.  Patient reports that he had an episode of chest pain and shortness of breath Friday evening after he returned from an emergency room visit at [facility] where he was diagnosed with left lower extremity DVT.  He reports the pain in his retrosternum, 10/10 in intensity associated with radiation to the neck and the jaws.  He also had associated shortness of breath with that.  The pain was pressure-like sensation.  He did not seek any medical help at that time as he had just returned from an ER visit.  Over the weekend he continued to feel lousy with decreased energy and stamina.  He also endorses diaphoresis at that time.  He also complains of chills and sore throat.  Patient was diagnosed with left lower extremity DVT during the ER visit 3/5/2022 at [facility] and was prescribed Lovenox/warfarin however he has not filled that as he could not afford the cost.  He was also prescribed Keflex for suspected cellulitis, likewise he did not fill prescription of Keflex either.  He denies any fever.  No nausea or vomiting.  He does endorse intermittent loose bowel movements however no bloody stools.  Denies dysuria urinary urgency or frequency.    In the emergency room the patient was seen by Dr. Wright.  A CT chest was negative for pulmonary embolism.  Troponin done today was positive at 2660.  Presentation now  concerning for non-ST elevation MI.  He was started on heparin drip.      Past Medical History    I have reviewed this patient's medical history and updated it with pertinent information if needed.   No past medical history on file.    Past Surgical History   I have reviewed this patient's surgical history and updated it with pertinent information if needed.  No past surgical history on file.    Prior to Admission Medications   Prior to Admission Medications   Prescriptions Last Dose Informant Patient Reported? Taking?   CLONAZEPAM PO   Yes No   Sig: Take 0.5 mg by mouth At Bedtime   DULOXETINE HCL PO   Yes No   Sig: Take 60 mg by mouth daily   Multiple Vitamins-Minerals (CENTRUM SILVER) per tablet   Yes No   Sig: Take 1 tablet by mouth daily   NAPROXEN PO   Yes No   Sig: Take 500 mg by mouth daily   RISPERIDONE PO   Yes No   Sig: Take 0.5 mg by mouth 2 times daily   TRAZODONE HCL PO   Yes No   Sig: Take 100 mg by mouth At Bedtime   methylPREDNISolone (MEDROL DOSEPAK) 4 MG tablet   No No   Sig: Follow package instructions      Facility-Administered Medications: None     Allergies   Allergies   Allergen Reactions     Iodine Rash     Topical        Social History   I have reviewed this patient's social history and updated it with pertinent information if needed. Nasim Montez  reports that he has never smoked. He has never used smokeless tobacco. He reports previous alcohol use. He reports that he does not use drugs.    Family History   I have reviewed this patient's family history and updated it with pertinent information if needed.   No family history on file.    Review of Systems   The 10 point Review of Systems is negative other than noted in the HPI or here.     Physical Exam   Temp: 98  F (36.7  C) Temp src: Oral BP: 128/81 Pulse: 101   Resp: 26 SpO2: 97 %      Vital Signs with Ranges  Temp:  [98  F (36.7  C)] 98  F (36.7  C)  Pulse:  [101-110] 101  Resp:  [26-30] 26  BP: (128)/(81) 128/81  SpO2:  [97 %-98 %] 97  %  240 lbs 0 oz    Gen: AAOX3, NAD, comfortable  HEENT: Moist oral mucosa, no pallor or icterus  Neck: Supple neck, good ROM, no stridor  Resp: CTA B/L, normal WOB; no crackles; no wheezes  CVS- RRR, no murmur, no edema  Abd/GI: Soft, non-tender. BS- normoactive  Skin- Warm, dry, no rashes  MSK: Left leg with recent surgical incision which has healed well, minimal erythema, no tenderness, trace edema  Neuro- CN- intact. Moving X 4; No focal deficits.     Data   Data reviewed today:  I personally reviewed the EKG tracing showing Sinus tachycardia.  Recent Labs   Lab 03/07/22  1517 03/07/22  1443   WBC  --  14.0*   HGB  --  14.3   MCV  --  94   PLT  --  198   NA  --  131*   POTASSIUM  --  3.5   CHLORIDE  --  101   CO2  --  25   BUN  --  9   CR 0.9 0.93   ANIONGAP  --  5   IBIS  --  8.7   GLC  --  134*       Recent Results (from the past 24 hour(s))   Head CT w/o contrast    Narrative    CT HEAD W/O CONTRAST 3/7/2022 3:43 PM    INDICATION: Headache, intracranial hemorrhage suspected  TECHNIQUE: CT scan of the head without contrast. Dose reduction  techniques were used.  CONTRAST:  None.  COMPARISON: None.    FINDINGS:   No evidence of acute parenchymal hemorrhage or mass effect. Brain  attenuation morphology are normal. The ventricles and sulci are normal  for age. Normal gray-white matter differentiation. The basal cisterns  are patent.    The globes are unremarkable. The partially imaged paranasal sinuses,  as are air cells and middle ear cavities are unremarkable. The  visualized skull base and calvarium are unremarkable.      Impression    IMPRESSION:  1.  No evidence of acute intracranial hemorrhage or mass effect.    ARYA VILLEGAS MD         SYSTEM ID:  [id number]   CT Chest Pulmonary Embolism w Contrast    Narrative    CT CHEST PULMONARY EMBOLISM WITH CONTRAST 3/7/2022 3:51 PM    CLINICAL HISTORY: Shortness of breath. Deep venous thrombosis.  TECHNIQUE: CT angiogram chest during arterial phase injection  IV  contrast. 2D and 3D MIP reconstructions were performed by the CT  technologist. Dose reduction techniques were used.   CONTRAST: 79mL Isovue-370      COMPARISON: None.    FINDINGS:  ANGIOGRAM CHEST: No evidence for pulmonary embolism. Thoracic aorta is  negative for dissection. No CT evidence of right heart strain.    LUNGS AND PLEURA: Trace amount of pleural fluid is noted bilaterally.  Mild scarring and/or atelectasis in the lingula. 0.6 cm indeterminate  pulmonary nodule along the right minor fissure (series 5 image 159)  indeterminate 0.5 cm pulmonary nodule in the right middle lobe (series  5 image 165).    MEDIASTINUM/AXILLAE: No enlarged lymph nodes in the chest. No  pericardial effusion.    CORONARY ARTERY CALCIFICATION: None.    UPPER ABDOMEN: Small hiatal hernia. Limited views of the upper abdomen  are otherwise unremarkable.    MUSCULOSKELETAL: Degenerative changes in the thoracic spine.      Impression    IMPRESSION:  1.  No evidence for pulmonary embolism.  2.  Trace amount of pleural fluid bilaterally.  3.  There are two indeterminate pulmonary nodules in the right lung,  with the largest measuring 0.6 cm. Please refer to pulmonary nodule  follow-up guidelines below.    Recommendations for an incidental lung nodule = or > 6mm to 8mm:    Low risk patients: Initial follow-up CT at 6-12 months, then  consider CT at 18-24 months if no change.    High risk patients: Initial follow-up CT at 6-12 months, then CT at  18-24 months if no change.    *Low Risk: Minimal or absent history of smoking or other known risk  factors.  *Nonsolid (ground-glass) or partly solid nodules may require longer  follow-up to exclude indolent adenocarcinoma.  *Recommendations based on Guidelines for the Management of Incidental  Pulmonary Nodules Detected at CT: From the Fleischner Society 2017,  Radiology 2017.  *Guidelines apply to incidental nodules in patients who are 35 years  or older.  *Guidelines do not apply to lung  cancer screening, patients with  immunosuppression, or patients with known primary cancer.    LIZETTE WALTON MD         SYSTEM ID:  [id number]

## 2022-03-07 NOTE — PHARMACY-ADMISSION MEDICATION HISTORY
Pharmacy Medication History  Admission medication history interview status for the 3/7/2022  admission is complete. See EPIC admission navigator for prior to admission medications     Location of Interview: Patient room  Medication history sources: Patient and Surescripts    Significant changes made to the medication list:      In the past week, patient estimated taking medication this percent of the time: greater than 90%    Additional medication history information:     Medication reconciliation completed by provider prior to medication history? No    Time spent in this activity: 30min     Prior to Admission medications    Medication Sig Last Dose Taking? Auth Provider   acetaminophen (TYLENOL) 500 MG tablet Take 500-1,000 mg by mouth every 6 hours as needed for mild pain  Yes Unknown, Entered By History   aspirin 81 MG EC tablet Take 81 mg by mouth daily 3/7/2022 at Unknown time Yes Unknown, Entered By History   celecoxib (CELEBREX) 200 MG capsule Take 200 mg by mouth 2 times daily as needed for moderate pain  Yes Unknown, Entered By History   clonazePAM (KLONOPIN) 0.5 MG tablet Take 0.5 mg by mouth At Bedtime  3/6/2022 at Unknown time Yes Reported, Patient   DULoxetine (CYMBALTA) 60 MG capsule Take 60 mg by mouth daily  3/7/2022 at Unknown time Yes Reported, Patient   enoxaparin ANTICOAGULANT (LOVENOX) 120 MG/0.8ML syringe Inject 110 mg Subcutaneous every 12 hours not started Yes Unknown, Entered By History   gabapentin (NEURONTIN) 100 MG capsule Take 300 mg by mouth 3 times daily as needed  Yes Unknown, Entered By History   Multiple Vitamins-Minerals (CENTRUM SILVER) per tablet Take 1 tablet by mouth daily 3/7/2022 at Unknown time Yes Reported, Patient   nortriptyline (PAMELOR) 25 MG capsule Take 25 mg by mouth At Bedtime 3/6/2022 at Unknown time Yes Unknown, Entered By History   ondansetron (ZOFRAN-ODT) 8 MG ODT tab Take 8 mg by mouth every 8 hours as needed for nausea  Yes Unknown, Entered By History    oxyCODONE (OXY-IR) 5 MG capsule Take 5 mg by mouth every 4 hours as needed for severe pain  Yes Unknown, Entered By History   risperiDONE (RISPERDAL) 0.5 MG tablet Take 0.5 mg by mouth 2 times daily 3/7/2022 Yes Unknown, Entered By History   simvastatin (ZOCOR) 40 MG tablet Take 20 mg by mouth At Bedtime 3/6/2022 at Unknown time Yes Unknown, Entered By History   tamsulosin (FLOMAX) 0.4 MG capsule Take 0.4 mg by mouth daily 3/7/2022 at Unknown time Yes Unknown, Entered By History   naproxen (NAPROSYN) 500 MG tablet Take 500 mg by mouth 2 times daily as needed    Reported, Patient   traZODone (DESYREL) 100 MG tablet Take 200 mg by mouth nightly as needed    Reported, Patient       The information provided in this note is only as accurate as the sources available at the time of update(s)   Nelida FongD

## 2022-03-07 NOTE — Clinical Note
Potential access sites were evaluated for patency using ultrasound.   The left radial artery was selected. Access was obtained under with Sonosite guidance using a micropuncture 21 gauge needle with direct visualization of needle entry.

## 2022-03-07 NOTE — ED NOTES
[facility]  ED Nurse Handoff Report    ED Chief complaint: Shortness of Breath      ED Diagnosis:   Final diagnoses:   NSTEMI (non-ST elevated myocardial infarction) (H)   Acute deep vein thrombosis (DVT) of calf muscle vein of left lower extremity (H)       Code Status: see prior    Allergies:   Allergies   Allergen Reactions     Iodine Rash     Topical        Patient Story: Nasim Montez is a 62 year old male who presents with shortness of breath and DVT diagnosed on 3/4. On Friday he was aching, his head hurt, was short of breath and had left leg pain. Nasim has been feeling like he is going to lose consciousness. [facility] and was diagnosed with DVT. He was prescribed with Lovenox and Coumadin but has not been able to get those medications. Nasim has been short of breath all weekend. He denies having any other medical problems and is not taking blood thinners.    Focused Assessment:  Pt aox4. VSS.   Labs Ordered and Resulted from Time of ED Arrival to Time of ED Departure   BASIC METABOLIC PANEL - Abnormal       Result Value    Sodium 131 (*)     Potassium 3.5      Chloride 101      Carbon Dioxide (CO2) 25      Anion Gap 5      Urea Nitrogen 9      Creatinine 0.93      Calcium 8.7      Glucose 134 (*)     GFR Estimate >90     TROPONIN I - Abnormal    Troponin I High Sensitivity 2,660 (*)    CBC WITH PLATELETS AND DIFFERENTIAL - Abnormal    WBC Count 14.0 (*)     RBC Count 4.53      Hemoglobin 14.3      Hematocrit 42.4      MCV 94      MCH 31.6      MCHC 33.7      RDW 12.3      Platelet Count 198      % Neutrophils 79      % Lymphocytes 10      % Monocytes 9      % Eosinophils 1      % Basophils 0      % Immature Granulocytes 1      NRBCs per 100 WBC 0      Absolute Neutrophils 11.1 (*)     Absolute Lymphocytes 1.4      Absolute Monocytes 1.2      Absolute Eosinophils 0.1      Absolute Basophils 0.0      Absolute Immature Granulocytes 0.1      Absolute NRBCs 0.0     NT PROBNP INPATIENT -  Normal    N terminal Pro BNP Inpatient 505     LACTIC ACID WHOLE BLOOD - Normal    Lactic Acid 1.0     INFLUENZA A/B & SARS-COV2 PCR MULTIPLEX - Normal    Influenza A PCR Negative      Influenza B PCR Negative      SARS CoV2 PCR Negative     ISTAT CREATININE POCT - Normal    Creatinine POCT 0.9      GFR, ESTIMATED POCT >60     BLOOD CULTURE   BLOOD CULTURE   STREPTOCOCCUS A RAPID SCREEN W REFELX TO PCR     CT Chest Pulmonary Embolism w Contrast   Final Result   IMPRESSION:   1.  No evidence for pulmonary embolism.   2.  Trace amount of pleural fluid bilaterally.   3.  There are two indeterminate pulmonary nodules in the right lung,   with the largest measuring 0.6 cm. Please refer to pulmonary nodule   follow-up guidelines below.      Recommendations for an incidental lung nodule = or > 6mm to 8mm:     Low risk patients: Initial follow-up CT at 6-12 months, then   consider CT at 18-24 months if no change.     High risk patients: Initial follow-up CT at 6-12 months, then CT at   18-24 months if no change.      *Low Risk: Minimal or absent history of smoking or other known risk   factors.   *Nonsolid (ground-glass) or partly solid nodules may require longer   follow-up to exclude indolent adenocarcinoma.   *Recommendations based on Guidelines for the Management of Incidental   Pulmonary Nodules Detected at CT: From the Fleischner Society 2017,   Radiology 2017.   *Guidelines apply to incidental nodules in patients who are 35 years   or older.   *Guidelines do not apply to lung cancer screening, patients with   immunosuppression, or patients with known primary cancer.      LIZETTE WALTON MD            SYSTEM ID:  [id number]      Head CT w/o contrast   Final Result   IMPRESSION:   1.  No evidence of acute intracranial hemorrhage or mass effect.      ARYA VILLEGAS MD            SYSTEM ID:  [id number]            Treatments and/or interventions provided: Hep gtt.. ASA  Patient's response to treatments and/or  interventions: na    To be done/followed up on inpatient unit:  na    Does this patient have any cognitive concerns?: no  Activity level - Baseline/Home:  Independent  Activity Level - Current:   Independent    Patient's Preferred language: English   Needed?: No    Isolation: None  Infection: Not Applicable  Patient tested for COVID 19 prior to admission: YES  Bariatric?: No    Vital Signs:   Vitals:    03/07/22 1439 03/07/22 1547   BP: 128/81    Pulse: 110 101   Resp: 30 26   Temp: 98  F (36.7  C)    TempSrc: Oral    SpO2: 98% 97%   Weight: 108.9 kg (240 lb)    Height: 1.829 m (6')        Cardiac Rhythm:     Was the PSS-3 completed:   Yes  What interventions are required if any?               Family Comments: na  OBS brochure/video discussed/provided to patient/family: N/A              Name of person given brochure if not patient: na              Relationship to patient: na    For the majority of the shift this patient's behavior was Green.   Behavioral interventions performed were na.    ED NURSE PHONE NUMBER: [id number]

## 2022-03-08 ENCOUNTER — APPOINTMENT (OUTPATIENT)
Dept: CARDIOLOGY | Facility: CLINIC | Age: 62
DRG: 281 | End: 2022-03-08
Attending: INTERNAL MEDICINE
Payer: COMMERCIAL

## 2022-03-08 ENCOUNTER — APPOINTMENT (OUTPATIENT)
Dept: CARDIOLOGY | Facility: CLINIC | Age: 62
DRG: 281 | End: 2022-03-08
Payer: COMMERCIAL

## 2022-03-08 LAB
ALBUMIN SERPL-MCNC: 2.8 G/DL (ref 3.4–5)
ALP SERPL-CCNC: 76 U/L (ref 40–150)
ALT SERPL W P-5'-P-CCNC: 41 U/L (ref 0–70)
ANION GAP SERPL CALCULATED.3IONS-SCNC: 5 MMOL/L (ref 3–14)
AST SERPL W P-5'-P-CCNC: 32 U/L (ref 0–45)
BI-PLANE LVEF ECHO: NORMAL
BILIRUB DIRECT SERPL-MCNC: 0.3 MG/DL (ref 0–0.2)
BILIRUB SERPL-MCNC: 0.9 MG/DL (ref 0.2–1.3)
BUN SERPL-MCNC: 9 MG/DL (ref 7–30)
CALCIUM SERPL-MCNC: 8.5 MG/DL (ref 8.5–10.1)
CHLORIDE BLD-SCNC: 104 MMOL/L (ref 94–109)
CHOLEST SERPL-MCNC: 166 MG/DL
CO2 SERPL-SCNC: 26 MMOL/L (ref 20–32)
CREAT SERPL-MCNC: 0.82 MG/DL (ref 0.66–1.25)
ERYTHROCYTE [DISTWIDTH] IN BLOOD BY AUTOMATED COUNT: 12.3 % (ref 10–15)
GFR SERPL CREATININE-BSD FRML MDRD: >90 ML/MIN/1.73M2
GLUCOSE BLD-MCNC: 124 MG/DL (ref 70–99)
HCT VFR BLD AUTO: 38.1 % (ref 40–53)
HDLC SERPL-MCNC: 34 MG/DL
HGB BLD-MCNC: 12.8 G/DL (ref 13.3–17.7)
LDLC SERPL CALC-MCNC: 98 MG/DL
LVEF ECHO: NORMAL
MCH RBC QN AUTO: 31.4 PG (ref 26.5–33)
MCHC RBC AUTO-ENTMCNC: 33.6 G/DL (ref 31.5–36.5)
MCV RBC AUTO: 94 FL (ref 78–100)
NONHDLC SERPL-MCNC: 132 MG/DL
PLATELET # BLD AUTO: 198 10E3/UL (ref 150–450)
POTASSIUM BLD-SCNC: 3.8 MMOL/L (ref 3.4–5.3)
PROT SERPL-MCNC: 6.6 G/DL (ref 6.8–8.8)
RBC # BLD AUTO: 4.07 10E6/UL (ref 4.4–5.9)
SODIUM SERPL-SCNC: 135 MMOL/L (ref 133–144)
TRIGL SERPL-MCNC: 169 MG/DL
TROPONIN I SERPL HS-MCNC: 3596 NG/L
TROPONIN I SERPL HS-MCNC: 4811 NG/L
UFH PPP CHRO-ACNC: 0.11 IU/ML
UFH PPP CHRO-ACNC: 0.16 IU/ML
UFH PPP CHRO-ACNC: 0.22 IU/ML
WBC # BLD AUTO: 9.8 10E3/UL (ref 4–11)

## 2022-03-08 PROCEDURE — 250N000013 HC RX MED GY IP 250 OP 250 PS 637: Performed by: INTERNAL MEDICINE

## 2022-03-08 PROCEDURE — 999N000208 ECHOCARDIOGRAM COMPLETE

## 2022-03-08 PROCEDURE — 84484 ASSAY OF TROPONIN QUANT: CPT | Performed by: INTERNAL MEDICINE

## 2022-03-08 PROCEDURE — 36415 COLL VENOUS BLD VENIPUNCTURE: CPT | Performed by: INTERNAL MEDICINE

## 2022-03-08 PROCEDURE — A9585 GADOBUTROL INJECTION: HCPCS | Performed by: PHYSICIAN ASSISTANT

## 2022-03-08 PROCEDURE — 250N000013 HC RX MED GY IP 250 OP 250 PS 637: Performed by: NURSE PRACTITIONER

## 2022-03-08 PROCEDURE — 255N000002 HC RX 255 OP 636: Performed by: PHYSICIAN ASSISTANT

## 2022-03-08 PROCEDURE — 85027 COMPLETE CBC AUTOMATED: CPT | Performed by: INTERNAL MEDICINE

## 2022-03-08 PROCEDURE — 210N000002 HC R&B HEART CARE

## 2022-03-08 PROCEDURE — 75561 CARDIAC MRI FOR MORPH W/DYE: CPT

## 2022-03-08 PROCEDURE — 80053 COMPREHEN METABOLIC PANEL: CPT | Performed by: INTERNAL MEDICINE

## 2022-03-08 PROCEDURE — 99233 SBSQ HOSP IP/OBS HIGH 50: CPT | Performed by: INTERNAL MEDICINE

## 2022-03-08 PROCEDURE — 93306 TTE W/DOPPLER COMPLETE: CPT | Mod: 26 | Performed by: INTERNAL MEDICINE

## 2022-03-08 PROCEDURE — 250N000011 HC RX IP 250 OP 636

## 2022-03-08 PROCEDURE — 255N000002 HC RX 255 OP 636: Performed by: INTERNAL MEDICINE

## 2022-03-08 PROCEDURE — 75561 CARDIAC MRI FOR MORPH W/DYE: CPT | Mod: 26 | Performed by: INTERNAL MEDICINE

## 2022-03-08 PROCEDURE — 250N000011 HC RX IP 250 OP 636: Performed by: INTERNAL MEDICINE

## 2022-03-08 PROCEDURE — 80061 LIPID PANEL: CPT | Performed by: INTERNAL MEDICINE

## 2022-03-08 PROCEDURE — 82248 BILIRUBIN DIRECT: CPT | Performed by: NURSE PRACTITIONER

## 2022-03-08 PROCEDURE — 85520 HEPARIN ASSAY: CPT | Performed by: INTERNAL MEDICINE

## 2022-03-08 PROCEDURE — 99223 1ST HOSP IP/OBS HIGH 75: CPT | Mod: 25 | Performed by: INTERNAL MEDICINE

## 2022-03-08 RX ORDER — ACETAMINOPHEN 325 MG/1
650 TABLET ORAL EVERY 4 HOURS PRN
Status: DISCONTINUED | OUTPATIENT
Start: 2022-03-08 | End: 2022-03-09

## 2022-03-08 RX ORDER — ACETAMINOPHEN 650 MG/1
650 SUPPOSITORY RECTAL EVERY 4 HOURS PRN
Status: DISCONTINUED | OUTPATIENT
Start: 2022-03-08 | End: 2022-03-10 | Stop reason: HOSPADM

## 2022-03-08 RX ORDER — LIDOCAINE 40 MG/G
CREAM TOPICAL
Status: CANCELLED | OUTPATIENT
Start: 2022-03-08

## 2022-03-08 RX ORDER — GADOBUTROL 604.72 MG/ML
14 INJECTION INTRAVENOUS ONCE
Status: COMPLETED | OUTPATIENT
Start: 2022-03-08 | End: 2022-03-08

## 2022-03-08 RX ORDER — SODIUM CHLORIDE 9 MG/ML
INJECTION, SOLUTION INTRAVENOUS CONTINUOUS
Status: CANCELLED | OUTPATIENT
Start: 2022-03-08

## 2022-03-08 RX ORDER — DIAZEPAM 5 MG
5 TABLET ORAL
Status: COMPLETED | OUTPATIENT
Start: 2022-03-08 | End: 2022-03-08

## 2022-03-08 RX ORDER — LORAZEPAM 2 MG/ML
0.5 INJECTION INTRAMUSCULAR
Status: CANCELLED | OUTPATIENT
Start: 2022-03-08

## 2022-03-08 RX ORDER — LORAZEPAM 0.5 MG/1
0.5 TABLET ORAL
Status: CANCELLED | OUTPATIENT
Start: 2022-03-08

## 2022-03-08 RX ORDER — POTASSIUM CHLORIDE 1500 MG/1
20 TABLET, EXTENDED RELEASE ORAL
Status: CANCELLED | OUTPATIENT
Start: 2022-03-08

## 2022-03-08 RX ORDER — DIAZEPAM 5 MG
5 TABLET ORAL
Status: CANCELLED | OUTPATIENT
Start: 2022-03-08

## 2022-03-08 RX ORDER — ASPIRIN 81 MG/1
243 TABLET, CHEWABLE ORAL ONCE
Status: CANCELLED | OUTPATIENT
Start: 2022-03-08

## 2022-03-08 RX ORDER — ASPIRIN 325 MG
325 TABLET ORAL ONCE
Status: CANCELLED | OUTPATIENT
Start: 2022-03-08 | End: 2022-03-08

## 2022-03-08 RX ADMIN — HEPARIN SODIUM 2100 UNITS/HR: 1000 INJECTION INTRAVENOUS; SUBCUTANEOUS at 00:31

## 2022-03-08 RX ADMIN — HEPARIN SODIUM 2400 UNITS/HR: 1000 INJECTION INTRAVENOUS; SUBCUTANEOUS at 11:38

## 2022-03-08 RX ADMIN — ACETAMINOPHEN 650 MG: 325 TABLET, FILM COATED ORAL at 21:28

## 2022-03-08 RX ADMIN — CLONAZEPAM 0.5 MG: 0.5 TABLET ORAL at 21:28

## 2022-03-08 RX ADMIN — ATORVASTATIN CALCIUM 40 MG: 40 TABLET, FILM COATED ORAL at 08:56

## 2022-03-08 RX ADMIN — CLONAZEPAM 0.5 MG: 0.5 TABLET ORAL at 00:21

## 2022-03-08 RX ADMIN — TRAZODONE HYDROCHLORIDE 200 MG: 50 TABLET ORAL at 00:21

## 2022-03-08 RX ADMIN — ASPIRIN 81 MG 81 MG: 81 TABLET ORAL at 08:56

## 2022-03-08 RX ADMIN — TRAZODONE HYDROCHLORIDE 200 MG: 50 TABLET ORAL at 21:29

## 2022-03-08 RX ADMIN — MORPHINE SULFATE 1 MG: 2 INJECTION, SOLUTION INTRAMUSCULAR; INTRAVENOUS at 11:53

## 2022-03-08 RX ADMIN — CEPHALEXIN 500 MG: 500 CAPSULE ORAL at 05:54

## 2022-03-08 RX ADMIN — HEPARIN SODIUM 2100 UNITS/HR: 1000 INJECTION INTRAVENOUS; SUBCUTANEOUS at 01:43

## 2022-03-08 RX ADMIN — HEPARIN SODIUM 2550 UNITS/HR: 1000 INJECTION INTRAVENOUS; SUBCUTANEOUS at 22:45

## 2022-03-08 RX ADMIN — MORPHINE SULFATE 1 MG: 2 INJECTION, SOLUTION INTRAMUSCULAR; INTRAVENOUS at 09:00

## 2022-03-08 RX ADMIN — ACETAMINOPHEN 650 MG: 325 TABLET, FILM COATED ORAL at 06:17

## 2022-03-08 RX ADMIN — METOPROLOL TARTRATE 25 MG: 25 TABLET, FILM COATED ORAL at 08:56

## 2022-03-08 RX ADMIN — CEPHALEXIN 500 MG: 500 CAPSULE ORAL at 21:28

## 2022-03-08 RX ADMIN — DIAZEPAM 5 MG: 5 TABLET ORAL at 13:56

## 2022-03-08 RX ADMIN — CEPHALEXIN 500 MG: 500 CAPSULE ORAL at 13:56

## 2022-03-08 RX ADMIN — HUMAN ALBUMIN MICROSPHERES AND PERFLUTREN 6 ML: 10; .22 INJECTION, SOLUTION INTRAVENOUS at 10:27

## 2022-03-08 RX ADMIN — GADOBUTROL 14 ML: 604.72 INJECTION INTRAVENOUS at 16:10

## 2022-03-08 RX ADMIN — METOPROLOL TARTRATE 25 MG: 25 TABLET, FILM COATED ORAL at 21:28

## 2022-03-08 ASSESSMENT — ACTIVITIES OF DAILY LIVING (ADL)
ADLS_ACUITY_SCORE: 6
ADLS_ACUITY_SCORE: 6
DRESSING/BATHING_DIFFICULTY: NO
ADLS_ACUITY_SCORE: 6
WEAR_GLASSES_OR_BLIND: YES
ADLS_ACUITY_SCORE: 6
CHANGE_IN_FUNCTIONAL_STATUS_SINCE_ONSET_OF_CURRENT_ILLNESS/INJURY: NO
DOING_ERRANDS_INDEPENDENTLY_DIFFICULTY: NO
ADLS_ACUITY_SCORE: 14
ADLS_ACUITY_SCORE: 6
CONCENTRATING,_REMEMBERING_OR_MAKING_DECISIONS_DIFFICULTY: NO
ADLS_ACUITY_SCORE: 6
ADLS_ACUITY_SCORE: 6
ADLS_ACUITY_SCORE: 14
ADLS_ACUITY_SCORE: 12
FALL_HISTORY_WITHIN_LAST_SIX_MONTHS: NO
ADLS_ACUITY_SCORE: 6
ADLS_ACUITY_SCORE: 12
WALKING_OR_CLIMBING_STAIRS_DIFFICULTY: NO
ADLS_ACUITY_SCORE: 6
ADLS_ACUITY_SCORE: 14
DIFFICULTY_EATING/SWALLOWING: NO
ADLS_ACUITY_SCORE: 14
TOILETING_ISSUES: NO
ADLS_ACUITY_SCORE: 14
ADLS_ACUITY_SCORE: 6

## 2022-03-08 NOTE — PLAN OF CARE
Goal Outcome Evaluation:    Plan of Care Reviewed With: patient      Patient is been NPO all day. Continues to have headache gave morphine 1 mg 2 times IV. Patient is having MRI today and angiogram tomorrow. Patient is up with assist on heparin drip. Continue to monitor.

## 2022-03-08 NOTE — PROVIDER NOTIFICATION
-2 MM  Pt requesting PTA night time meds Trazodone and Clonazepam. Can we order these?    Thanks,  Alexander GOVEA   *[phone]    -1 MM  CRITICAL Lab pt Positive for Strep  Precautions orders?    thanks,  Alexander GOVEA   *[phone]

## 2022-03-08 NOTE — CONSULTS
It is documented that the [facility], so I would anticipate he gets or can get his medications filled at their pharmacy at low or no cost.    Xarelto, Eliquis, and Pradaxa are all on the [facility] formulary.  Upon receipt of RX, [facility] can provide one month free.  Cash price if patient chooses to get subsequent fills at a retail pharmacy is $459/mo.     Jantoven (warfarin)  On the [facility] formulary.  Cash price if patient chooses to get subsequent fills at a retail pharmacy is $15mo.    [facility] can fill a 14 day supply of all other discharge meds for no charge as part of the patient's [facility] benefit.      -EVIE Coronado, Pharmacy Technician/Liaison, [facility], [phone]

## 2022-03-08 NOTE — PROGRESS NOTES
.RECEIVING UNIT ED HANDOFF REVIEW    ED Nurse Handoff Report was reviewed by: Alexander Gonzalez RN on March 7, 2022 at 7:42 PM

## 2022-03-08 NOTE — PLAN OF CARE
...Neuro- AxO 4  Tele/Cardiac- SR  Resp- RA  Activity- 1AGW, Bedrest due to syncope episodes  Pain- LE pain See emar   Drips- Hep 2100  Drains/Tubes- PIV x 1  Skin- Obese, LLE edema   GI/- Urinal at bedside  Aggression Color- Green   COVID status- Neg  Plan- Echo. Cards Consult

## 2022-03-08 NOTE — PROGRESS NOTES
-1 MM  Pt experiencing HA and Jaw pain. Can we order PRN pain meds PO?     Thanks,  Alexander GOVEA   *[phone]

## 2022-03-08 NOTE — CONSULTS
[facility]    Cardiology Consultation     COVID negative    Date of Admission:  3/7/2022  Date of Consult (When I saw the patient): 03/08/22  Pt seen and examined by me today all observations and plans are mine  1 hour visit after seeing patient and working with cath lab schedule and MRI scheduling  Assessment & Plan   Nasim Montez is a 62 year old male who was admitted on 3/7/2022. I was asked to see the patient for elevated troponin and chest pain.    1. Chest pain with positive troponin in the setting of Strep throat  Pain is atypical in that it is non exertional and worse with deep breath and lying back  Myopericarditis/pericarditis is very high in the differential but cannot exclude CAD  Troponin elevated  Fever, chills last Friday  Echo: biventricular global reduced function with modest valve dysfunction on echocardiogram    PLAN:  CMR today with pre-med/valium for claustrophobia  Angiogram tomorrow  Risks and benefits of left heart catheterization and coronary angiogram were discussed with the patient in detail. 0.1-0.3% (for diagnostic angio) and 1-2% (for PCI)  risk of stroke, MI, death, emergent bypass for diagnostic angio, risk of contrast induced allergic reaction, renal dysfunction, vascular complications were discussed. Pros and cons of bare metal Vs drug eluting stents was discussed. Patient understands and wishes to proceed with it.  Check hepatic panel  Continue IV Heparin    2. Recent LLE DVT  On IV Heparin    3. Compartment syndrome LLE with left ankle arthroscopy, treatment of chondral defect,syndesmosis revision fixation, repair of peroneal tendons, hindfoot ligament reconstruction, superficial peroneal nerve release, 4 compartment release of left leg at [facility] mid January.    4. PTSD, anxiety    5 alcohol use in past, dry since 2016    6. IVONE    7. Dyslipidemia  LDL 98 HDL 34  On simvastatin at home; changed to Crestor here    8. 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      During an emergency room visit on 3 5 he was found to have cellulitis in that left extremity and was placed on Keflex therapy which he did not fill.  In addition he was diagnosed with a left posterior tibial DVT and was prescribed Lovenox with warfarin and was discharged from the emergency room.  He was unable to fill these due to cost. He reports a fever, chills fatigue last Friday and has noted progressive sob and chest pain for three weeks. Chest pain is nonexertional and worse with a deep breath and lying back. He has had headaches and malaise since Friday and was sent to ED by his MD following his LLE surgery.    He had first two COVID vaccines in 2021, due for Booster which he states is scheduled at [facility]; has not had COVID and is COVID negative this admission.    He is positive for strep this admission, indeterminate right pulmonary nodules, largest 0.6 cm with follow-up CT recommended in 6 to 12 months, mild hyponatremia, benign prostatic hypertrophy on Flomax, anxiety and PTSD     Chest CT is negative for pulmonary emboli.  He is on a heparin infusion as positive for Left DVT    Troponin level 2660, 3596, 4811.    Twelve-lead EKG with no significant ST-T wave changes, perhaps mild elevation of the ST  segment in lead I only but not significant      Other laboratory data: Sodium 135 potassium 3.8 BUN 9 creatinine 0.82 total cholesterol 166 HDL low at 34 LDL 98 triglycerides 169 elevated fasting glucose at 124. Hemoglobin 12.8 WBC 9.8 Platelets 198          Past Medical History   I have reviewed this patient's medical history and updated it with pertinent information if needed.     No past medical history on file.  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  Dyslipidemia, strep A, impaired fasting glucose, pulmonary nodules, benign prostatic hypertrophy, PTSD and anxiety, IVONE substance abuse  Alcohol abuse, dry since 2016    Past Surgical History   I have reviewed this patient's surgical history and updated it with pertinent information if needed.  No past surgical history on file.  Left ankle surgery as noted above  Appendectomy  Bilateral knee arthroscopy  Bilateral rotator cuff tendon repair    Prior to Admission Medications   Prior to Admission Medications   Prescriptions Last Dose Informant Patient Reported? Taking?   DULoxetine (CYMBALTA) 60 MG capsule 3/7/2022 at Unknown time  Yes Yes   Sig: Take 60 mg by mouth daily    Multiple Vitamins-Minerals (CENTRUM SILVER) per tablet 3/7/2022 at Unknown time  Yes Yes   Sig: Take 1 tablet by mouth daily   acetaminophen (TYLENOL) 500 MG tablet   Yes Yes   Sig: Take 500-1,000 mg by mouth every 6 hours as needed for mild pain   aspirin 81 MG EC tablet 3/7/2022 at Unknown time  Yes Yes   Sig: Take 81 mg by mouth daily   celecoxib (CELEBREX) 200 MG capsule   Yes Yes   Sig: Take 200 mg by mouth 2 times daily as needed for moderate pain   clonazePAM (KLONOPIN) 0.5 MG tablet 3/6/2022 at Unknown  time  Yes Yes   Sig: Take 0.5 mg by mouth At Bedtime    enoxaparin ANTICOAGULANT (LOVENOX) 120 MG/0.8ML syringe not started  Yes Yes   Sig: Inject 110 mg Subcutaneous every 12 hours   gabapentin (NEURONTIN) 100 MG capsule   Yes Yes   Sig: Take 300 mg by mouth 3 times daily as needed   naproxen (NAPROSYN) 500 MG tablet   Yes No   Sig: Take 500 mg by mouth 2 times daily as needed    nortriptyline (PAMELOR) 25 MG capsule 3/6/2022 at Unknown time  Yes Yes   Sig: Take 25 mg by mouth At Bedtime   ondansetron (ZOFRAN-ODT) 8 MG ODT tab   Yes Yes   Sig: Take 8 mg by mouth every 8 hours as needed for nausea   oxyCODONE (OXY-IR) 5 MG capsule   Yes Yes   Sig: Take 5 mg by mouth every 4 hours as needed for severe pain   risperiDONE (RISPERDAL) 0.5 MG tablet 3/7/2022  Yes Yes   Sig: Take 0.5 mg by mouth 2 times daily   simvastatin (ZOCOR) 40 MG tablet 3/6/2022 at Unknown time  Yes Yes   Sig: Take 20 mg by mouth At Bedtime   tamsulosin (FLOMAX) 0.4 MG capsule 3/7/2022 at Unknown time  Yes Yes   Sig: Take 0.4 mg by mouth daily   traZODone (DESYREL) 100 MG tablet   Yes No   Sig: Take 200 mg by mouth nightly as needed       Facility-Administered Medications: None     Allergies   Allergies   Allergen Reactions     Iodine Rash     Topical        Social History   , previous alcohol use, dry since   No illicit drug use  Nonsmoker life long  Served in [employer]  Retired     Family History   I have reviewed this patient's family history and updated it with pertinent information if needed.   No family history on file.  Mother  from emphysema  Dad  from alzhiemer's    Review of Systems   The 10 point Review of Systems is negative other than noted in the HPI     Physical Exam   Temp: 97.8  F (36.6  C) Temp src: Oral BP: (!) 111/93 Pulse: 89   Resp: 16 SpO2: 94 % O2 Device: None (Room air)    Vital Signs with Ranges  Temp:  [97.8  F (36.6  C)-98.4  F (36.9  C)] 97.8  F (36.6  C)  Pulse:  [] 89  Resp:   [10-30] 16  BP: ()/(64-93) 111/93  SpO2:  [94 %-98 %] 94 %  240 lbs 0 oz    Constitutional     alert and oriented, in no acute distress.      Skin     warm and dry     ENT     no pallor or cyanosis-adentulous     Neck    Supple, JVP normal, no carotid bruit     Chest     no tenderness to palpation      Lungs  clear to auscultation      Cardiac  regular rhythm, S1 normal, S2 normal, No S3 or S4, no murmurs, no rubs-tones distant     Abdomen     abdomen soft, bowel sounds normoactive, no hepatosplenomegaly   ventral hernia  Extremities and Back     no clubbing, cyanosis. No edema observed.  LLE edema      Neurological     no gross motor deficits noted, affect appropriate, oriented to time, person and place.     Data   Recent Labs   Lab Test 03/08/22  0657 03/07/22  2351   WBC 9.8 10.7   HGB 12.8* 13.0*   MCV 94 95    191      Recent Labs   Lab Test 03/08/22  0657 03/07/22  1517 03/07/22  1443     --  131*   POTASSIUM 3.8  --  3.5   CHLORIDE 104  --  101   BUN 9  --  9   CR 0.82 0.9 0.93     Recent Labs   Lab 03/08/22  0657 03/07/22  1443   * 134*     Recent Labs   Lab Test 06/02/17  1610   ALT 18   AST 18     Troponin I ES   Date Value Ref Range Status   04/29/2020 0.024 0.000 - 0.045 ug/L Final     Comment:     The 99th percentile for upper reference range is 0.045 ug/L.  Troponin values   in the range of 0.045 - 0.120 ug/L may be associated with risks of adverse   clinical events.     04/28/2020 0.024 0.000 - 0.045 ug/L Final     Comment:     The 99th percentile for upper reference range is 0.045 ug/L.  Troponin values   in the range of 0.045 - 0.120 ug/L may be associated with risks of adverse   clinical events.     06/02/2017 0.042 0.000 - 0.045 ug/L Final     Comment:     The 99th percentile for upper reference range is 0.045 ug/L.  Troponin values   in   the range of 0.045 - 0.120 ug/L may be associated with risks of adverse   clinical events.         No lab results found.    Lab  Results   Component Value Date    CHOL 166 03/08/2022     Lab Results   Component Value Date    HDL 34 03/08/2022     Lab Results   Component Value Date    LDL 98 03/08/2022     Lab Results   Component Value Date    TRIG 169 03/08/2022     No results found for: CHOLHDLRATIO     No results found for: TSH    Recent Results (from the past 24 hour(s))   Head CT w/o contrast    Narrative    CT HEAD W/O CONTRAST 3/7/2022 3:43 PM    INDICATION: Headache, intracranial hemorrhage suspected  TECHNIQUE: CT scan of the head without contrast. Dose reduction  techniques were used.  CONTRAST:  None.  COMPARISON: None.    FINDINGS:   No evidence of acute parenchymal hemorrhage or mass effect. Brain  attenuation morphology are normal. The ventricles and sulci are normal  for age. Normal gray-white matter differentiation. The basal cisterns  are patent.    The globes are unremarkable. The partially imaged paranasal sinuses,  as are air cells and middle ear cavities are unremarkable. The  visualized skull base and calvarium are unremarkable.      Impression    IMPRESSION:  1.  No evidence of acute intracranial hemorrhage or mass effect.    ARYA VILLEGAS MD         SYSTEM ID:  [id number]   CT Chest Pulmonary Embolism w Contrast    Narrative    CT CHEST PULMONARY EMBOLISM WITH CONTRAST 3/7/2022 3:51 PM    CLINICAL HISTORY: Shortness of breath. Deep venous thrombosis.  TECHNIQUE: CT angiogram chest during arterial phase injection IV  contrast. 2D and 3D MIP reconstructions were performed by the CT  technologist. Dose reduction techniques were used.   CONTRAST: 79mL [id number]      COMPARISON: None.    FINDINGS:  ANGIOGRAM CHEST: No evidence for pulmonary embolism. Thoracic aorta is  negative for dissection. No CT evidence of right heart strain.    LUNGS AND PLEURA: Trace amount of pleural fluid is noted bilaterally.  Mild scarring and/or atelectasis in the lingula. 0.6 cm indeterminate  pulmonary nodule along the right minor fissure  (series 5 image 159)  indeterminate 0.5 cm pulmonary nodule in the right middle lobe (series  5 image 165).    MEDIASTINUM/AXILLAE: No enlarged lymph nodes in the chest. No  pericardial effusion.    CORONARY ARTERY CALCIFICATION: None.    UPPER ABDOMEN: Small hiatal hernia. Limited views of the upper abdomen  are otherwise unremarkable.    MUSCULOSKELETAL: Degenerative changes in the thoracic spine.      Impression    IMPRESSION:  1.  No evidence for pulmonary embolism.  2.  Trace amount of pleural fluid bilaterally.  3.  There are two indeterminate pulmonary nodules in the right lung,  with the largest measuring 0.6 cm. Please refer to pulmonary nodule  follow-up guidelines below.    Recommendations for an incidental lung nodule = or > 6mm to 8mm:    Low risk patients: Initial follow-up CT at 6-12 months, then  consider CT at 18-24 months if no change.    High risk patients: Initial follow-up CT at 6-12 months, then CT at  18-24 months if no change.    *Low Risk: Minimal or absent history of smoking or other known risk  factors.  *Nonsolid (ground-glass) or partly solid nodules may require longer  follow-up to exclude indolent adenocarcinoma.  *Recommendations based on Guidelines for the Management of Incidental  Pulmonary Nodules Detected at CT: From the Fleischner Society 2017,  Radiology 2017.  *Guidelines apply to incidental nodules in patients who are 35 years  or older.  *Guidelines do not apply to lung cancer screening, patients with  immunosuppression, or patients with known primary cancer.    LIZETTE WALTON MD         SYSTEM ID:  [id number]

## 2022-03-08 NOTE — PROGRESS NOTES
X-cover    Group A strep positive. On keflex 500 mg q8 hours, will need 10 day course.    Lucho Green MD

## 2022-03-08 NOTE — PROGRESS NOTES
[facility]    Hospitalist Progress Note    Assessment & Plan   Nasim Montez is a 62 year old male who presents with chest pain and shortness of breath.     Non-ST elevation MI  Possible pericarditis/myopericarditis  -Patient presents with concerning symptoms of chest pain and dyspnea, troponin is trending up, CT chest negative for PE.  --Appreciate cardiology input, plan is to obtain cardiac MRI today, angiogram tomorrow.  --Continue heparin drip, echocardiogram results noted,Left ventricular systolic function is moderately reduced. The visual ejectionfraction is 30-35%. There is moderate global hypokinesia of the left ventricle noted.  Mild MR and TR noted.  -Continue aspirin 81 mg daily, Lipitor 40 mg, continue low-dose beta-blockers.       Left posterior tibial veins DVT  -This was diagnosed at [facility] on 3/5/2022  -Patient was prescribed Lovenox/warfarin and discharged from the emergency room, however he was not able to fill it due to cost.  -Continue heparin drip.    -Social work/care transition and pharmacy liaison consult for appropriate anticoagulation(at discharge)  Group A strep infection  --Continue Keflex to complete 10-day course.  ?  Left lower extremity cellulitis.   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  -Patient underwent extensive above procedure at [facility] on 1/18/2022  -Patient was also found to have left lower extremity cellulitis during the ER visit on 3/5 and was discharged on Keflex.  Patient did not fill his Keflex either, mild erythema noted.  -Patient is on Keflex for strep, continue that to complete 10-day course.     Indeterminate right pulmonary nodules   -CT chest revealed 2 pulmonary nodules, largest one at  0.6 cm. Follow-up with CT in 6 to 12 months.     Mild hyponatremia  -Resolved     BPH  -Continue prior to admission Flomax     PTSD  Anxiety  -Resume PTA Cymbalta, nortriptyline, Klonopin, Risperdal and trazodone once verified       DVT Prophylaxis: Heparin GTT  Code Status: Full Code     Disposition: Expected discharge in 1 to 2 days    Janneth Andrews MD  Text Page   (7am to 6pm)    Interval History   Overnight events noted, patient was diagnosed with group A strep, seen by cardiology today, denies any active chest pain.    -Data reviewed today: I reviewed all new labs and imaging results over the last 24 hours.  Physical Exam   Temp: 97.8  F (36.6  C) Temp src: Oral BP: (!) 111/93 Pulse: 89   Resp: 16 SpO2: 94 % O2 Device: None (Room air)    Vitals:    03/07/22 1439   Weight: 108.9 kg (240 lb)     Vital Signs with Ranges  Temp:  [97.8  F (36.6  C)-98.4  F (36.9  C)] 97.8  F (36.6  C)  Pulse:  [] 89  Resp:  [10-30] 16  BP: ()/(64-93) 111/93  SpO2:  [94 %-98 %] 94 %  I/O last 3 completed shifts:  In: -   Out: 1100 [Urine:1100]    Constitutional: Awake, alert, cooperative, no apparent distress  Respiratory: Clear to auscultation bilaterally, no crackles or wheezing  Cardiovascular: Regular rate and rhythm, normal S1 and S2, and no murmur noted  GI: Normal bowel sounds, soft, non-distended, non-tender  Skin/Integumen: No rashes, no cyanosis, trace lower extremity edema present  Neuro : moving all 4 extremities, no focal deficit noted     Medications     heparin 2,400 Units/hr (03/08/22 1138)     - MEDICATION INSTRUCTIONS -       - MEDICATION INSTRUCTIONS -       ACE/ARB/ARNI NOT PRESCRIBED         aspirin  81 mg Oral Daily     atorvastatin  40 mg Oral Daily     cephALEXin  500 mg Oral Q8H WESLY     clonazePAM  0.5 mg Oral At Bedtime     metoprolol tartrate  25 mg Oral BID     sodium chloride (PF)  10 mL Intracatheter Once     sodium chloride (PF)  3 mL Intracatheter Q8H     sodium chloride (PF)  3 mL  Intracatheter Q8H       Data   Recent Labs   Lab 03/08/22  0657 03/07/22  2351 03/07/22  1517 03/07/22  1443   WBC 9.8 10.7  --  14.0*   HGB 12.8* 13.0*  --  14.3   MCV 94 95  --  94    191  --  198     --   --  131*   POTASSIUM 3.8  --   --  3.5   CHLORIDE 104  --   --  101   CO2 26  --   --  25   BUN 9  --   --  9   CR 0.82  --  0.9 0.93   ANIONGAP 5  --   --  5   IBIS 8.5  --   --  8.7   *  --   --  134*   ALBUMIN 2.8*  --   --   --    PROTTOTAL 6.6*  --   --   --    BILITOTAL 0.9  --   --   --    ALKPHOS 76  --   --   --    ALT 41  --   --   --    AST 32  --   --   --      Recent Labs   Lab 03/08/22  0657 03/07/22  1443   * 134*       Imaging:   Recent Results (from the past 24 hour(s))   Head CT w/o contrast    Narrative    CT HEAD W/O CONTRAST 3/7/2022 3:43 PM    INDICATION: Headache, intracranial hemorrhage suspected  TECHNIQUE: CT scan of the head without contrast. Dose reduction  techniques were used.  CONTRAST:  None.  COMPARISON: None.    FINDINGS:   No evidence of acute parenchymal hemorrhage or mass effect. Brain  attenuation morphology are normal. The ventricles and sulci are normal  for age. Normal gray-white matter differentiation. The basal cisterns  are patent.    The globes are unremarkable. The partially imaged paranasal sinuses,  as are air cells and middle ear cavities are unremarkable. The  visualized skull base and calvarium are unremarkable.      Impression    IMPRESSION:  1.  No evidence of acute intracranial hemorrhage or mass effect.    ARYA VILLEGAS MD         SYSTEM ID:  [id number]   CT Chest Pulmonary Embolism w Contrast    Narrative    CT CHEST PULMONARY EMBOLISM WITH CONTRAST 3/7/2022 3:51 PM    CLINICAL HISTORY: Shortness of breath. Deep venous thrombosis.  TECHNIQUE: CT angiogram chest during arterial phase injection IV  contrast. 2D and 3D MIP reconstructions were performed by the CT  technologist. Dose reduction techniques were used.   CONTRAST: 79mL  Isovue-370      COMPARISON: None.    FINDINGS:  ANGIOGRAM CHEST: No evidence for pulmonary embolism. Thoracic aorta is  negative for dissection. No CT evidence of right heart strain.    LUNGS AND PLEURA: Trace amount of pleural fluid is noted bilaterally.  Mild scarring and/or atelectasis in the lingula. 0.6 cm indeterminate  pulmonary nodule along the right minor fissure (series 5 image 159)  indeterminate 0.5 cm pulmonary nodule in the right middle lobe (series  5 image 165).    MEDIASTINUM/AXILLAE: No enlarged lymph nodes in the chest. No  pericardial effusion.    CORONARY ARTERY CALCIFICATION: None.    UPPER ABDOMEN: Small hiatal hernia. Limited views of the upper abdomen  are otherwise unremarkable.    MUSCULOSKELETAL: Degenerative changes in the thoracic spine.      Impression    IMPRESSION:  1.  No evidence for pulmonary embolism.  2.  Trace amount of pleural fluid bilaterally.  3.  There are two indeterminate pulmonary nodules in the right lung,  with the largest measuring 0.6 cm. Please refer to pulmonary nodule  follow-up guidelines below.    Recommendations for an incidental lung nodule = or > 6mm to 8mm:    Low risk patients: Initial follow-up CT at 6-12 months, then  consider CT at 18-24 months if no change.    High risk patients: Initial follow-up CT at 6-12 months, then CT at  18-24 months if no change.    *Low Risk: Minimal or absent history of smoking or other known risk  factors.  *Nonsolid (ground-glass) or partly solid nodules may require longer  follow-up to exclude indolent adenocarcinoma.  *Recommendations based on Guidelines for the Management of Incidental  Pulmonary Nodules Detected at CT: From the Fleischner Society 2017,  Radiology 2017.  *Guidelines apply to incidental nodules in patients who are 35 years  or older.  *Guidelines do not apply to lung cancer screening, patients with  immunosuppression, or patients with known primary cancer.    LIZETTE WALTON MD         SYSTEM ID:   [id number]   Echocardiogram Complete   Result Value    LVEF  30-35%    Biplane LVEF 35%    [facility]    [id number]  [id number]  [id number]  [id number]^RITA^RAMIRO     [facility]  Echocardiography Laboratory  [facility] [place] [address]     Name: NAY KWONG  MRN: [id number]  : 1960  Study Date: 2022 10:09 AM  Age: 62 yrs  Gender: Male  Patient Location: [facility]  Reason For Study: MI - Acute  Ordering Physician: RAMIRO SALINAS  Referring Physician: RAMIRO SALINAS  Performed By: Tiffanie Koenig     BSA: 2.3 m2  Height: 72 in  Weight: 240 lb  HR: 77  ______________________________________________________________________________  Procedure  Complete Portable Echo Adult. Optison (NDC #[id number]) given intravenously.  ______________________________________________________________________________  Interpretation Summary     Left ventricular systolic function is moderately reduced. The visual ejection  fraction is 30-35%. There is moderate global hypokinesia of the left  ventricle.  The left atrium is mildly dilated. Right atrial size is normal.  There is mild (1+) tricuspid and mitral regurgitation.  No prior study for comparison. Technically adequate study.  ______________________________________________________________________________  Left Ventricle  The left ventricle is normal in size. Left ventricular systolic function is  moderately reduced. The visual ejection fraction is 30-35%. Biplane LVEF is  35%. Grade I or early diastolic dysfunction. There is moderate global  hypokinesia of the left ventricle.     Right Ventricle  The right ventricle is normal in size and function.     Atria  The left atrium is mildly dilated. Right atrial size is normal.     Mitral Valve  There is mild (1+) mitral regurgitation.     Tricuspid Valve  There is mild (1+) tricuspid regurgitation. The right ventricular systolic  pressure is approximated at 22.4 mmHg plus the right atrial  pressure.     Aortic Valve  Normal tricuspid aortic valve. No aortic stenosis is present.     Pulmonic Valve  The pulmonic valve is not well visualized.     Vessels  Normal size aorta. IVC diameter <2.1 cm collapsing >50% with sniff suggests a  normal RA pressure of 3 mmHg.     Pericardium  There is no pericardial effusion.     Rhythm  Sinus rhythm was noted.  ______________________________________________________________________________  MMode/2D Measurements & Calculations     IVSd: 0.92 cm  LVIDd: 4.9 cm  LVIDs: 4.2 cm  LVPWd: 0.86 cm  FS: 14.8 %  LV mass(C)d: 150.2 grams  LV mass(C)dI: 65.3 grams/m2  Ao root diam: 3.6 cm  asc Aorta Diam: 3.5 cm  LVOT diam: 2.1 cm  LVOT area: 3.5 cm2  LA Volume (BP): 83.8 ml  LA Volume Index (BP): 36.4 ml/m2  RWT: 0.35     Doppler Measurements & Calculations  MV E max keenan: 50.9 cm/sec  MV A max keenan: 79.5 cm/sec  MV E/A: 0.64  MV dec slope: 324.5 cm/sec2  Ao V2 max: 124.9 cm/sec  Ao max P.0 mmHg  ASIF(V,D): 2.7 cm2  LV V1 max PG: 3.7 mmHg  LV V1 max: 96.5 cm/sec  LV V1 VTI: 19.6 cm  SV(LVOT): 68.7 ml  SI(LVOT): 29.9 ml/m2  PA V2 max: 85.5 cm/sec  PA max P.9 mmHg  PA acc time: 0.08 sec  TR max keenan: 236.5 cm/sec  TR max P.4 mmHg  AV Keenan Ratio (DI): 0.77  E/E' av.6  Lateral E/e': 7.9  Medial E/e': 9.4     ______________________________________________________________________________  Report approved by: Minesh Pearson 2022 11:23 AM

## 2022-03-09 LAB
CATH EF ESTIMATED: 45 %
POTASSIUM BLD-SCNC: 3.8 MMOL/L (ref 3.4–5.3)
UFH PPP CHRO-ACNC: 0.53 IU/ML
UFH PPP CHRO-ACNC: 0.63 IU/ML

## 2022-03-09 PROCEDURE — 210N000002 HC R&B HEART CARE

## 2022-03-09 PROCEDURE — 99232 SBSQ HOSP IP/OBS MODERATE 35: CPT | Performed by: INTERNAL MEDICINE

## 2022-03-09 PROCEDURE — 4A023N7 MEASUREMENT OF CARDIAC SAMPLING AND PRESSURE, LEFT HEART, PERCUTANEOUS APPROACH: ICD-10-PCS | Performed by: INTERNAL MEDICINE

## 2022-03-09 PROCEDURE — B2151ZZ FLUOROSCOPY OF LEFT HEART USING LOW OSMOLAR CONTRAST: ICD-10-PCS | Performed by: INTERNAL MEDICINE

## 2022-03-09 PROCEDURE — 250N000013 HC RX MED GY IP 250 OP 250 PS 637: Performed by: INTERNAL MEDICINE

## 2022-03-09 PROCEDURE — 258N000003 HC RX IP 258 OP 636: Performed by: NURSE PRACTITIONER

## 2022-03-09 PROCEDURE — 86658 ENTEROVIRUS ANTIBODY: CPT | Performed by: INTERNAL MEDICINE

## 2022-03-09 PROCEDURE — 250N000011 HC RX IP 250 OP 636: Performed by: INTERNAL MEDICINE

## 2022-03-09 PROCEDURE — 99152 MOD SED SAME PHYS/QHP 5/>YRS: CPT | Performed by: INTERNAL MEDICINE

## 2022-03-09 PROCEDURE — 36415 COLL VENOUS BLD VENIPUNCTURE: CPT | Performed by: INTERNAL MEDICINE

## 2022-03-09 PROCEDURE — 250N000009 HC RX 250: Performed by: INTERNAL MEDICINE

## 2022-03-09 PROCEDURE — 85520 HEPARIN ASSAY: CPT | Performed by: INTERNAL MEDICINE

## 2022-03-09 PROCEDURE — 84132 ASSAY OF SERUM POTASSIUM: CPT | Performed by: INTERNAL MEDICINE

## 2022-03-09 PROCEDURE — B2111ZZ FLUOROSCOPY OF MULTIPLE CORONARY ARTERIES USING LOW OSMOLAR CONTRAST: ICD-10-PCS | Performed by: INTERNAL MEDICINE

## 2022-03-09 PROCEDURE — 93458 L HRT ARTERY/VENTRICLE ANGIO: CPT | Performed by: INTERNAL MEDICINE

## 2022-03-09 PROCEDURE — 93458 L HRT ARTERY/VENTRICLE ANGIO: CPT | Mod: 26 | Performed by: INTERNAL MEDICINE

## 2022-03-09 PROCEDURE — 99233 SBSQ HOSP IP/OBS HIGH 50: CPT | Performed by: INTERNAL MEDICINE

## 2022-03-09 PROCEDURE — C1769 GUIDE WIRE: HCPCS | Performed by: INTERNAL MEDICINE

## 2022-03-09 PROCEDURE — 99153 MOD SED SAME PHYS/QHP EA: CPT | Performed by: INTERNAL MEDICINE

## 2022-03-09 PROCEDURE — C1894 INTRO/SHEATH, NON-LASER: HCPCS | Performed by: INTERNAL MEDICINE

## 2022-03-09 PROCEDURE — 272N000001 HC OR GENERAL SUPPLY STERILE: Performed by: INTERNAL MEDICINE

## 2022-03-09 PROCEDURE — 93005 ELECTROCARDIOGRAM TRACING: CPT

## 2022-03-09 PROCEDURE — 258N000003 HC RX IP 258 OP 636: Performed by: INTERNAL MEDICINE

## 2022-03-09 RX ORDER — IOPAMIDOL 755 MG/ML
INJECTION, SOLUTION INTRAVASCULAR
Status: DISCONTINUED | OUTPATIENT
Start: 2022-03-09 | End: 2022-03-09 | Stop reason: HOSPADM

## 2022-03-09 RX ORDER — NALOXONE HYDROCHLORIDE 0.4 MG/ML
0.2 INJECTION, SOLUTION INTRAMUSCULAR; INTRAVENOUS; SUBCUTANEOUS
Status: DISCONTINUED | OUTPATIENT
Start: 2022-03-09 | End: 2022-03-09

## 2022-03-09 RX ORDER — SODIUM CHLORIDE 9 MG/ML
75 INJECTION, SOLUTION INTRAVENOUS CONTINUOUS
Status: ACTIVE | OUTPATIENT
Start: 2022-03-09 | End: 2022-03-09

## 2022-03-09 RX ORDER — OXYCODONE HYDROCHLORIDE 5 MG/1
5 TABLET ORAL EVERY 4 HOURS PRN
Status: DISCONTINUED | OUTPATIENT
Start: 2022-03-09 | End: 2022-03-10 | Stop reason: HOSPADM

## 2022-03-09 RX ORDER — ACETAMINOPHEN 325 MG/1
650 TABLET ORAL EVERY 4 HOURS PRN
Status: DISCONTINUED | OUTPATIENT
Start: 2022-03-09 | End: 2022-03-10 | Stop reason: HOSPADM

## 2022-03-09 RX ORDER — NALOXONE HYDROCHLORIDE 0.4 MG/ML
0.4 INJECTION, SOLUTION INTRAMUSCULAR; INTRAVENOUS; SUBCUTANEOUS
Status: DISCONTINUED | OUTPATIENT
Start: 2022-03-09 | End: 2022-03-09

## 2022-03-09 RX ORDER — POTASSIUM CHLORIDE 1500 MG/1
20 TABLET, EXTENDED RELEASE ORAL
Status: DISCONTINUED | OUTPATIENT
Start: 2022-03-09 | End: 2022-03-10 | Stop reason: HOSPADM

## 2022-03-09 RX ORDER — NITROGLYCERIN 5 MG/ML
VIAL (ML) INTRAVENOUS
Status: DISCONTINUED | OUTPATIENT
Start: 2022-03-09 | End: 2022-03-09 | Stop reason: HOSPADM

## 2022-03-09 RX ORDER — FENTANYL CITRATE 50 UG/ML
INJECTION, SOLUTION INTRAMUSCULAR; INTRAVENOUS
Status: DISCONTINUED | OUTPATIENT
Start: 2022-03-09 | End: 2022-03-09 | Stop reason: HOSPADM

## 2022-03-09 RX ORDER — FENTANYL CITRATE 50 UG/ML
25 INJECTION, SOLUTION INTRAMUSCULAR; INTRAVENOUS
Status: DISCONTINUED | OUTPATIENT
Start: 2022-03-09 | End: 2022-03-10 | Stop reason: HOSPADM

## 2022-03-09 RX ORDER — HEPARIN SODIUM 1000 [USP'U]/ML
INJECTION, SOLUTION INTRAVENOUS; SUBCUTANEOUS
Status: DISCONTINUED | OUTPATIENT
Start: 2022-03-09 | End: 2022-03-09 | Stop reason: HOSPADM

## 2022-03-09 RX ORDER — LIDOCAINE 40 MG/G
CREAM TOPICAL
Status: DISCONTINUED | OUTPATIENT
Start: 2022-03-09 | End: 2022-03-09

## 2022-03-09 RX ORDER — IBUPROFEN 400 MG/1
400 TABLET, FILM COATED ORAL EVERY 6 HOURS PRN
Status: DISCONTINUED | OUTPATIENT
Start: 2022-03-09 | End: 2022-03-10 | Stop reason: HOSPADM

## 2022-03-09 RX ORDER — OXYCODONE HYDROCHLORIDE 5 MG/1
10 TABLET ORAL EVERY 4 HOURS PRN
Status: DISCONTINUED | OUTPATIENT
Start: 2022-03-09 | End: 2022-03-10 | Stop reason: HOSPADM

## 2022-03-09 RX ORDER — SODIUM CHLORIDE 9 MG/ML
INJECTION, SOLUTION INTRAVENOUS CONTINUOUS
Status: DISCONTINUED | OUTPATIENT
Start: 2022-03-09 | End: 2022-03-10 | Stop reason: HOSPADM

## 2022-03-09 RX ORDER — LORAZEPAM 2 MG/ML
0.5 INJECTION INTRAMUSCULAR
Status: DISCONTINUED | OUTPATIENT
Start: 2022-03-09 | End: 2022-03-10 | Stop reason: HOSPADM

## 2022-03-09 RX ORDER — ATROPINE SULFATE 0.1 MG/ML
0.5 INJECTION INTRAVENOUS
Status: ACTIVE | OUTPATIENT
Start: 2022-03-09 | End: 2022-03-09

## 2022-03-09 RX ORDER — LORAZEPAM 0.5 MG/1
0.5 TABLET ORAL
Status: DISCONTINUED | OUTPATIENT
Start: 2022-03-09 | End: 2022-03-10 | Stop reason: HOSPADM

## 2022-03-09 RX ORDER — HEPARIN SODIUM 10000 [USP'U]/100ML
0-5000 INJECTION, SOLUTION INTRAVENOUS CONTINUOUS
Status: DISCONTINUED | OUTPATIENT
Start: 2022-03-09 | End: 2022-03-09

## 2022-03-09 RX ORDER — COLCHICINE 0.6 MG/1
0.6 TABLET ORAL 2 TIMES DAILY
Status: DISCONTINUED | OUTPATIENT
Start: 2022-03-09 | End: 2022-03-10 | Stop reason: HOSPADM

## 2022-03-09 RX ORDER — FLUMAZENIL 0.1 MG/ML
0.2 INJECTION, SOLUTION INTRAVENOUS
Status: ACTIVE | OUTPATIENT
Start: 2022-03-09 | End: 2022-03-09

## 2022-03-09 RX ORDER — VERAPAMIL HYDROCHLORIDE 2.5 MG/ML
INJECTION, SOLUTION INTRAVENOUS
Status: DISCONTINUED | OUTPATIENT
Start: 2022-03-09 | End: 2022-03-09 | Stop reason: HOSPADM

## 2022-03-09 RX ORDER — ASPIRIN 325 MG
325 TABLET ORAL ONCE
Status: DISCONTINUED | OUTPATIENT
Start: 2022-03-09 | End: 2022-03-09

## 2022-03-09 RX ADMIN — MORPHINE SULFATE 1 MG: 2 INJECTION, SOLUTION INTRAMUSCULAR; INTRAVENOUS at 17:22

## 2022-03-09 RX ADMIN — ATORVASTATIN CALCIUM 40 MG: 40 TABLET, FILM COATED ORAL at 08:26

## 2022-03-09 RX ADMIN — HEPARIN SODIUM 2550 UNITS/HR: 1000 INJECTION INTRAVENOUS; SUBCUTANEOUS at 08:44

## 2022-03-09 RX ADMIN — COLCHICINE 0.6 MG: 0.6 TABLET, FILM COATED ORAL at 13:47

## 2022-03-09 RX ADMIN — CEPHALEXIN 500 MG: 500 CAPSULE ORAL at 05:54

## 2022-03-09 RX ADMIN — CEPHALEXIN 500 MG: 500 CAPSULE ORAL at 20:29

## 2022-03-09 RX ADMIN — NITROGLYCERIN 0.4 MG: 0.4 TABLET SUBLINGUAL at 11:16

## 2022-03-09 RX ADMIN — COLCHICINE 0.6 MG: 0.6 TABLET, FILM COATED ORAL at 20:29

## 2022-03-09 RX ADMIN — MORPHINE SULFATE 1 MG: 2 INJECTION, SOLUTION INTRAMUSCULAR; INTRAVENOUS at 10:47

## 2022-03-09 RX ADMIN — TRAZODONE HYDROCHLORIDE 200 MG: 50 TABLET ORAL at 20:48

## 2022-03-09 RX ADMIN — ASPIRIN 81 MG 324 MG: 81 TABLET ORAL at 08:29

## 2022-03-09 RX ADMIN — METOPROLOL TARTRATE 25 MG: 25 TABLET, FILM COATED ORAL at 20:30

## 2022-03-09 RX ADMIN — CLONAZEPAM 0.5 MG: 0.5 TABLET ORAL at 20:30

## 2022-03-09 RX ADMIN — CEPHALEXIN 500 MG: 500 CAPSULE ORAL at 13:47

## 2022-03-09 RX ADMIN — SODIUM CHLORIDE 150 ML/HR: 9 INJECTION, SOLUTION INTRAVENOUS at 08:32

## 2022-03-09 RX ADMIN — METOPROLOL TARTRATE 25 MG: 25 TABLET, FILM COATED ORAL at 08:26

## 2022-03-09 RX ADMIN — ACETAMINOPHEN 650 MG: 325 TABLET, FILM COATED ORAL at 17:22

## 2022-03-09 ASSESSMENT — ACTIVITIES OF DAILY LIVING (ADL)
ADLS_ACUITY_SCORE: 6
ADLS_ACUITY_SCORE: 8
ADLS_ACUITY_SCORE: 8
ADLS_ACUITY_SCORE: 6
ADLS_ACUITY_SCORE: 6
ADLS_ACUITY_SCORE: 8
ADLS_ACUITY_SCORE: 8
ADLS_ACUITY_SCORE: 6
ADLS_ACUITY_SCORE: 8
ADLS_ACUITY_SCORE: 6
ADLS_ACUITY_SCORE: 8
ADLS_ACUITY_SCORE: 6
ADLS_ACUITY_SCORE: 6
ADLS_ACUITY_SCORE: 8
ADLS_ACUITY_SCORE: 8
ADLS_ACUITY_SCORE: 6
ADLS_ACUITY_SCORE: 8

## 2022-03-09 NOTE — PLAN OF CARE
Goal Outcome Evaluation:    Plan of Care Reviewed With: patient        Patient had angiogram today no intervention, tolerated food post angiogram, denies pain, possible discharge home tomorrow. Left radial site is dry and intact no bedding no hematoma. VSS.

## 2022-03-09 NOTE — CONSULTS
"Care Management Initial Consult    General Information  Assessment completed with: Patient, Nasim  Type of CM/SW Visit: Initial Assessment  Primary Care Provider verified and updated as needed: Yes (Dr. Van Vranken [phone] at [facility] )   Readmission within the last 30 days:        Reason for Consult: discharge planning  Advance Care Planning:    no ACP documents on file in EPIC   General Information Comments: Pharmacy is at [facility]: P: [phone] / F: [phone]    Communication Assessment  Patient's communication style: spoken language (English or Bilingual)    Hearing Difficulty or Deaf: no   Wear Glasses or Blind: yes    Cognitive  Cognitive/Neuro/Behavioral: WDL                      Living Environment:   People in home:       Current living Arrangements:        Able to return to prior arrangements:       Family/Social Support:  Care provided by:    Provides care for:    Marital Status:   Support system: Significant Other Jaysrhee [phone]  Description of Support System:        Current Resources:   Patient receiving home care services:    Community Resources:    Equipment currently used at home: none  Supplies currently used at home:      Employment/Financial:  Employment Status: retired     Finance Comments: active [employer]/[facility] insurance     Lifestyle & Psychosocial Needs:  Outpatient \"social determinants of health\" assessment not done     Functional Status:  Prior to admission patient needed assistance: n/a    Mental Health Status:  Mental Health Status: Past Concern (PTSD, depression )       Chemical Dependency Status:  Chemical Dependency Status: Past Concern (previous alcohol use, dry since 2016)      Values/Beliefs:  Spiritual, Cultural Beliefs, Evangelical Practices, Values that affect care: no          Values/Beliefs Comment: Non-Caodaism    Additional Information:  Consult noted for:  Question Answer   Reason for Consult Discharge Planning/Disposition    Other (explain " "in comments)   Reason discharge meds     Please refer to the helpful information in the Discharge Pharmacy Liaison consult note:     It is documented that the [facility], so I would anticipate he gets or can get his medications filled at their pharmacy at low or no cost.     Xarelto, Eliquis, and Pradaxa are all on the VA formulary.  Upon receipt of RX, Discharge Pharmacy can provide one month free.  Cash price if patient chooses to get subsequent fills at a retail pharmacy is $459/mo.      Jantoven (warfarin)  On the VA formulary.  Cash price if patient chooses to get subsequent fills at a retail pharmacy is $15mo.     Discharge Pharmacy can fill a 14 day supply of all other discharge meds for no charge as part of the patient's VA benefit.      LANCE Coronado, Pharmacy Technician/Liaison, Discharge Pharmacy, [phone]    Met with patient in room, introduced self and role in discharge planning.  Reviewed the above.  He confirms his PCP is Dr. Van Vranken at the [facility]; NORMA-RN updated this in Epic    Van Vranken, Benjamin E [phone] [facility] [place] [address]     [facility]; NORMA-RN updated this in [facility] - [place], [place] - [facility]  P: [phone] / F: [phone]  Address:   [place] [address]   Operation:   Hours: Not open 24 hours   E-Prescribing   E-Prescribing controlled substances   Mode: Retail   Type: External       CM-RN verbalized anticipation of MD selecting anticoagulant and possibly using the one-month-free coupon to send pt home with this, with future refills at [facility] pharmacy above.  If any additional meds, pt can have 2 week fills.      CM-RN also updated Registration that pt is in room and may need face sheet updated;   There is a chart note in Care Everywhere from 3/4/22 \"Pt reports he now lives on [address] [place] [address] " "[facility].\" This is the location of [facility].           Wendy Wagner RN, BSN, PHN  [facility]  Inpatient Care Management - FLOAT  Mobile: [phone] 03/09/22 until 4pm  (after today's date, please call the patient's unit)     "

## 2022-03-09 NOTE — PROGRESS NOTES
Pt has no c/o pain/SOB following return from Cardiac MRI. Up independ in room.   Heparin gtt adjusted per order.   Plan for Angio tomorrow, no time scheduled as of yet. Pt aware.

## 2022-03-09 NOTE — PROGRESS NOTES
Neuro- AxO 4  Tele/Cardiac- SR  Resp- RA  Activity- Ind  Pain- LE pain See emar   Drips- Hep 2550  Drains/Tubes- PIV x 1  Skin- Obese, LLE edema   GI/- Urinal at bedside  Aggression Color- Green   COVID status- Neg  Plan- Angio 3/9

## 2022-03-09 NOTE — PROGRESS NOTES
[facility]    Hospitalist Progress Note    Assessment & Plan   Nasim Montez is a 62 year old male who presents with chest pain and shortness of breath.     Non-ST elevation MI  Possible pericarditis/myopericarditis  -Patient presents with concerning symptoms of chest pain and dyspnea, troponin is trending up, CT chest negative for PE.  --Appreciate cardiology input, patient had cardiac MRI on 3/8, possibility of myocarditis noted.  --Patient underwent coronary angiogram on 3/9 which did not indicate any significant CAD.  He had a chest pain prior to the angiogram.  --Continue heparin drip, echocardiogram results noted,Left ventricular systolic function is moderately reduced. The visual ejectionfraction is 30-35%. There is moderate global hypokinesia of the left ventricle noted.  Mild MR and TR noted.  -Continue aspirin 81 mg daily, Lipitor 40 mg, continue low-dose beta-blockers.  -Cardiology has started patient on colchicine, will monitor for ongoing chest pain, possible discharge tomorrow a.m.       Left posterior tibial veins DVT  -This was diagnosed at [facility] on 3/5/2022  -Patient was prescribed Lovenox/warfarin and discharged from the emergency room, however he was not able to fill it due to cost.  -Continue heparin drip.    -Pharmacy liaison input noted, patient would have to pay 459/month to obtain any of the DOAC's, there is a [facility] could [facility], will discuss this with him prior to discharge, if he can afford DOAC's will start him on Eliquis, if not he will be discharged on Lovenox and warfarin.  Group A strep infection  --Continue Keflex to complete 10-day course.  ?  Left lower extremity cellulitis.   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  -Patient underwent extensive above procedure at [facility] on 1/18/2022  -Patient was also found to have left lower extremity cellulitis during the ER visit on 3/5 and was discharged on Keflex.  Patient did not fill his Keflex either, mild erythema noted.  -Patient is on Keflex for strep, continue that to complete 10-day course.     Indeterminate right pulmonary nodules   -CT chest revealed 2 pulmonary nodules, largest one at 0.6 cm. Follow-up with CT in 6 to 12 months.     Mild hyponatremia  -Resolved     BPH  -Continue prior to admission Flomax     PTSD  Anxiety  -Resume PTA Cymbalta, nortriptyline, Klonopin, Risperdal and trazodone once verified       DVT Prophylaxis: Heparin GTT  Code Status: Full Code     Disposition: Expected discharge in 1 to 2 days    Janneth Andrews MD  Text Page   (7am to 6pm)    Interval History   Patient resting, denies any throat pain, no odynophagia noted, he was going to for angiogram after my visit.  Nursing mentioned patient having chest pain following my visit.    -Data reviewed today: I reviewed all new labs and imaging results over the last 24 hours.  Physical Exam   Temp: 98.2  F (36.8  C) Temp src: Oral BP: 108/68 Pulse: 79   Resp: 16 SpO2: 95 % O2 Device: Nasal cannula    Vitals:    03/07/22 1439 03/09/22 0503   Weight: 108.9 kg (240 lb) 107.6 kg (237 lb 3.2 oz)     Vital Signs with Ranges  Temp:  [98  F (36.7  C)-98.4  F (36.9  C)] 98.2  F (36.8  C)  Pulse:  [69-87] 79  Resp:  [16-18] 16  BP: ()/(63-78) 108/68  SpO2:  [92 %-95 %] 95 %  I/O last 3 completed shifts:  In: 483 [P.O.:480; I.V.:3]  Out: 1350 [Urine:1350]    Constitutional: Awake, alert, cooperative, no apparent distress  Respiratory: Clear to auscultation bilaterally, no crackles or wheezing  Cardiovascular: Regular rate and rhythm, normal S1 and S2, and no murmur noted  GI: Normal bowel sounds, soft, non-distended, non-tender  Skin/Integumen: No rashes, no cyanosis,  trace lower extremity edema present  Neuro : moving all 4 extremities, no focal deficit noted     Medications     - MEDICATION INSTRUCTIONS -       - MEDICATION INSTRUCTIONS -       - MEDICATION INSTRUCTIONS -       ACE/ARB/ARNI NOT PRESCRIBED       sodium chloride 75 mL/hr (22 1049)     sodium chloride 75 mL/hr (22 1330)       cephALEXin  500 mg Oral Q8H WESLY     clonazePAM  0.5 mg Oral At Bedtime     colchicine  0.6 mg Oral BID     metoprolol tartrate  25 mg Oral BID     sodium chloride (PF)  3 mL Intracatheter Q8H       Data   Recent Labs   Lab 22  0848 22  0657 22  2351 22  1517 22  1443   WBC  --  9.8 10.7  --  14.0*   HGB  --  12.8* 13.0*  --  14.3   MCV  --  94 95  --  94   PLT  --  198 191  --  198   NA  --  135  --   --  131*   POTASSIUM 3.8 3.8  --   --  3.5   CHLORIDE  --  104  --   --  101   CO2  --  26  --   --  25   BUN  --  9  --   --  9   CR  --  0.82  --  0.9 0.93   ANIONGAP  --  5  --   --  5   IBIS  --  8.5  --   --  8.7   GLC  --  124*  --   --  134*   ALBUMIN  --  2.8*  --   --   --    PROTTOTAL  --  6.6*  --   --   --    BILITOTAL  --  0.9  --   --   --    ALKPHOS  --  76  --   --   --    ALT  --  41  --   --   --    AST  --  32  --   --   --      Recent Labs   Lab 22  0657 22  1443   * 134*       Imaging:   Recent Results (from the past 24 hour(s))   MR Cardiac w contrast    Narrative                                                     [facility]                                                     CMR Report      MRN:                  [id number]                                  Name:        NAY KWONG                                  :                  1960                                  Scan Date:   2022 14:29:05                                  Electronically signed by Franklyn Truong 2022-Mar-08 17:03:04    VITALS    ==========================================================================================================    HEIGHT: 72.00 in    (182.88 cm)  WEIGHT: 240.00 lbs    (108.86 kgs)  BSA: 2.30 m^2    FINAL IMPRESSION   ==========================================================================================================    Normal size left ventricle with mildly decreased LV systolic function. Quantitative LVEF 48 %.  Normal size right ventricle with mildly reduced RV systolic function. Quantitative RVEF 51 %.  Delayed enhancement reveals small area of sub-epicardial hyperenhancement consistent with small, non CAD  scar in the distal lateral wall. This could be due to myocarditis.   Normal pericardial thickness without hyperenhancement. Small pericardial and bilateral small pleural  effusions noted.    SUMMARY   ==========================================================================================================    LEFT VENTRICLE: LV wall thickness is normal. Quantitative LVEF 48 %. LV cavity size is normal. LV systolic function is  mildly decreased globally.     VIABILITY: Delayed enhancement reveals small area of sub-epicardial hyperenhancement consistent with small, non CAD  scar in the distal lateral wall. This could be due to myocarditis.     RIGHT VENTRICLE: RV cavity size is normal. RV systolic function is mildly decreased globally. Quantitative RVEF 51 %.     LEFT ATRIUM: LA cavity size is normal.    RIGHT ATRIUM: RA cavity size is normal.    PERICARDIUM: There is a small pericardial effusion. Free breathing does not reveal any evidence of interventricular  dependence. IVC is normal in size.     PLEURAL EFFUSION: There is a small right pleural effusion. There is a small left pleural effusion.    AORTIC VALVE: Aortic valve is trileaflet.    AORTIC ROOT: The aortic root is normal in size.      CORE EXAM    ==========================================================================================================    MEASUREMENTS   ----------------------------------------------------------------------------------------      VOLUMETRIC ANALYSIS       ----------------------------------------------  .---------------------------------------------------------.                   LV    Reference   RV    Reference    +-----+-----------+------+------------+------+------------+   EDV  ml          192   (109-191)   183   (105-205)         ml/m^2       84   (60-95)      80   ()     ESV  ml          101   (27-72)      89   (20-80)           ml/m^2       44   (14-36)      39   (11-40)      CO   L/min      6.80              6.94                     L/min/m^2  2.95              3.01                SV   ml           92   ()     94   ()          ml/m^2       40   (40-64)      41   (37-69)      EF   %            48   (58-76)      51   (55-81)     '-----+-----------+------+------------+------+------------'            CARDIAC OUTPUT HR:  74 bpm      LV DIMENSIONS       ----------------------------------------------          WALL THICKNESS - ANTEROSEPTAL:  1.4 cm          WALL THICKNESS - INFEROLATERAL:  1.1 cm          LV NELSON:  5.3 cm        LA DIMENSIONS (LV SYSTOLE)       ----------------------------------------------          DIAMETER:  3.9 cm        AORTIC ROOT DIMENSIONS       ----------------------------------------------          ANNULUS:  3.5 cm        SCAN INFO   ==========================================================================================================    GENERAL   ----------------------------------------------------------------------------------------      SCANNER       ----------------------------------------------          :  SIEMENS          MODEL:  Aera          PULSE SEQUENCES:  SSFP cine, 2D LGE segmented, 2D  LGE single-shot, T1-weighted imaging, T2-weighted          imaging, HASTE morphology         CONTRAST AGENT       ----------------------------------------------          TYPE:  Gadavist          GD CONCENTRATION:  0.5 M          VOLUME ADMINISTERED:  11 ml          DOSAGE:  0.05 mmol/kg        SETUP       ----------------------------------------------          REASON(S) FOR SCAN:  Acute pericarditis, Pericardial effusion, Myocarditis (known/suspect)           ATTENDING PHYSICIAN:  CHRISTY ROSAS        BILLING   ==========================================================================================================    CPT Codes  ICD10 Codes      Report generated by [employer], a product of [employer]   Cardiac Catheterization   Result Value    Cath EF Estimated 45    Narrative      Left ventricular filling pressures are severely elevated.    The ejection fraction is 40-50% by visual estimate.     1.  Essentially normal coronary arteries in a codominant circulation with   a ramus intermedius.  2.  Left ventricular end-diastolic pressure 25 mmHg.  Mild global   hypokinesia estimate ejection fraction of 45%.  Trace mitral   regurgitation.  No gradient across aortic valve upon pullback.

## 2022-03-09 NOTE — PRE-PROCEDURE
GENERAL PRE-PROCEDURE:   Procedure:  Coronary angiogram, left heart andreina, left ventriculogram, possible intervention  Date/Time:  3/9/2022 12:01 PM    Written consent obtained?: Yes    Risks and benefits: Risks, benefits and alternatives were discussed    Consent given by:  Patient  Patient states understanding of procedure being performed: Yes    Patient's understanding of procedure matches consent: Yes    Procedure consent matches procedure scheduled: Yes    Expected level of sedation:  Moderate  Appropriately NPO:  Yes  ASA Class:  2  Mallampati  :  Grade 2- soft palate, base of uvula, tonsillar pillars, and portion of posterior pharyngeal wall visible  Lungs:  Lungs clear with good breath sounds bilaterally  Heart:  Normal heart sounds and rate  History & Physical reviewed:  History and physical reviewed and no updates needed  Statement of review:  I have reviewed the lab findings, diagnostic data, medications, and the plan for sedation

## 2022-03-09 NOTE — PROGRESS NOTES
[facility]    Cardiology Progress Note    Date of Service: 03/09/2022     Assessment & Plan   Nasim Montez is a 62 year old male with past medical history of LLE DVT (New), LLE compartment syndrome with extensive surgery, IVONE, previous alcohol abuse sober since 2016, PTSD, dyslipidemia, strep throat.  This patient was admitted on 3/7/2022 with symptoms of fever, chest pain, sob.      Nasim Montez is a 62 year old male who was admitted on 3/7/2022. I was asked to see the patient for elevated troponin and chest pain.     1. Chest pain with positive troponin in the setting of Strep throat  Pain is atypical in that it is non exertional and worse with deep breath and lying back  Troponin peak 4811  Fever, chills last Friday  Echo: biventricular global reduced function with modest valve dysfunction on echocardiogram  CMR yesterday:  Normal size left ventricle with mildly decreased LV systolic function.          Quantitative LVEF 48 %.  Normal size right ventricle with mildly reduced RV systolic function. Quantitative RVEF 51 %.  Delayed enhancement reveals small area of sub-epicardial hyperenhancement consistent with small, non CAD  scar in the distal lateral wall. This could be due to myocarditis.   Normal pericardial thickness without hyperenhancement. Small pericardial and bilateral small pleural  effusions noted.       PLAN:  Heart cath showed no significant CAD, had cp and ecg normal  Clinical history and exam and now neg cath but abnl MRI suggests this is myopericarditis  Will start colchicine, standard chf meds (low ef) and repeat echo to exclude rapid decline EF/myocarditis--biopsy if rapid change in EF  Did not order CRP/sed rate in the setting of strep throat-since that would make it positive anyway  Pt on keflex so probably no point in Blood cultures unless IM requests  Will order viral panel coxsackie or respiratory viral pattern  Low protein and albumin, liver  enzymes normal  May need RV biopsy if unclear diagnosis  Repeat echo in a few days to make sure not a rapid drop in EF  lopressor     2. Recent LLE DVT  On IV Heparin  Will likely need oral anticoagulation started after angiogram     3. Compartment syndrome LLE with left ankle arthroscopy, treatment of chondral defect,syndesmosis revision fixation, repair of peroneal tendons, hindfoot ligament reconstruction, superficial peroneal nerve release, 4 compartment release of left leg at [facility] mid January.     4. PTSD, anxiety     5 alcohol use in past, dry since 2016     6. IVONE     7. Dyslipidemia  LDL 98 HDL 34  On simvastatin at home; changed to Crestor here     8. Impaired fasting glucose     9. Strep A  Afebrile, on antibiotics        Interval History   Ongoing mild chest pain and SOB    Review of Systems:  The Review of Systems is negative other than noted in the HPI    Physical Exam   Temp: 98.2  F (36.8  C) Temp src: Oral BP: 104/68 Pulse: 77   Resp: 18 SpO2: 95 % O2 Device: None (Room air)    Vitals:    03/07/22 1439 03/09/22 0503   Weight: 108.9 kg (240 lb) 107.6 kg (237 lb 3.2 oz)     Vital Signs with Ranges  Temp:  [97.8  F (36.6  C)-98.4  F (36.9  C)] 98.2  F (36.8  C)  Pulse:  [77-89] 77  Resp:  [16-18] 18  BP: ()/(63-93) 104/68  SpO2:  [94 %-95 %] 95 %  I/O last 3 completed shifts:  In: 483 [P.O.:480; I.V.:3]  Out: 1350 [Urine:1350]    Constitutional     alert and oriented, in no acute distress.     Skin     warm and dry to touch    ENT     no pallor or cyanosis    Neck    Supple, JVP normal, no carotid bruit    Chest     no tenderness to palpation     Lungs  clear to auscultation     Cardiac  regular rhythm, S1 normal, S2 normal, No S3 or S4, no murmurs, no rubs    Abdomen     abdomen soft, bowel sounds normoactive, no hepatosplenomegaly    Extremities and Back     no clubbing, cyanosis. Trace edema LLE      Neurological     no gross motor deficits noted, affect appropriate,  oriented to time, person and place.        Medications     heparin 2,550 Units/hr (22 9223)     - MEDICATION INSTRUCTIONS -       - MEDICATION INSTRUCTIONS -       - MEDICATION INSTRUCTIONS -       ACE/ARB/ARNI NOT PRESCRIBED       sodium chloride         aspirin  81 mg Oral Daily     aspirin  325 mg Oral Once     atorvastatin  40 mg Oral Daily     cephALEXin  500 mg Oral Q8H WESLY     clonazePAM  0.5 mg Oral At Bedtime     metoprolol tartrate  25 mg Oral BID     sodium chloride (PF)  10 mL Intracatheter Once     sodium chloride (PF)  3 mL Intracatheter Q8H     sodium chloride (PF)  3 mL Intracatheter Q8H          Data:     ROUTINE IP LABS (Last four results)  BMP  Recent Labs   Lab 22  0657 22  1517 22  1443     --  131*   POTASSIUM 3.8  --  3.5   CHLORIDE 104  --  101   IBIS 8.5  --  8.7   CO2 26  --  25   BUN 9  --  9   CR 0.82 0.9 0.93   *  --  134*     CHOLESTEROL/HEPATIC  Recent Labs   Lab 22  0657   CHOL 166   TRIG 169*   LDL 98   HDL 34*   ALT 41   AST 32     CBC  Recent Labs   Lab 22  0657 22  2351 22  1443   WBC 9.8 10.7 14.0*   RBC 4.07* 4.10* 4.53   HGB 12.8* 13.0* 14.3   HCT 38.1* 38.9* 42.4   MCV 94 95 94   MCH 31.4 31.7 31.6   MCHC 33.6 33.4 33.7   RDW 12.3 12.3 12.3    191 198     TROP: No lab results found in last 7 days.   BNP:    Recent Labs   Lab 22  1443   NTBNPI 505     INRNo lab results found in last 7 days.  No results found for: TSH    EKG results:  Reviewed if available     Imaging:  Recent Results (from the past 24 hour(s))   Echocardiogram Complete   Result Value    LVEF  30-35%    Biplane LVEF 35%    Narrative    [id number]  [id number]  [id number]  [id number]^[email or web address]^RAMIRO     [facility]  Echocardiography Laboratory  [address] [place] [id number]     Name: NAY KWONG  MRN: [id number]  : 1960  Study Date: 2022 10:09 AM  Age: 62 yrs  Gender: Male  Patient Location:  [facility]  Reason For Study: [place] - Acute  Ordering Physician: RAMIRO SALINAS  Referring Physician: RAMIRO SALINAS  Performed By: Tiffanie Koenig     BSA: 2.3 m2  Height: 72 in  Weight: 240 lb  HR: 77  ______________________________________________________________________________  Procedure  Complete Portable Echo Adult. Optison (NDC #[id number]) given intravenously.  ______________________________________________________________________________  Interpretation Summary     Left ventricular systolic function is moderately reduced. The visual ejection  fraction is 30-35%. There is moderate global hypokinesia of the left  ventricle.  The left atrium is mildly dilated. Right atrial size is normal.  There is mild (1+) tricuspid and mitral regurgitation.  No prior study for comparison. Technically adequate study.  ______________________________________________________________________________  Left Ventricle  The left ventricle is normal in size. Left ventricular systolic function is  moderately reduced. The visual ejection fraction is 30-35%. Biplane LVEF is  35%. Grade I or early diastolic dysfunction. There is moderate global  hypokinesia of the left ventricle.     Right Ventricle  The right ventricle is normal in size and function.     Atria  The left atrium is mildly dilated. Right atrial size is normal.     Mitral Valve  There is mild (1+) mitral regurgitation.     Tricuspid Valve  There is mild (1+) tricuspid regurgitation. The right ventricular systolic  pressure is approximated at 22.4 mmHg plus the right atrial pressure.     Aortic Valve  Normal tricuspid aortic valve. No aortic stenosis is present.     Pulmonic Valve  The pulmonic valve is not well visualized.     Vessels  Normal size aorta. IVC diameter <2.1 cm collapsing >50% with sniff suggests a  normal RA pressure of 3 mmHg.     Pericardium  There is no pericardial effusion.     Rhythm  Sinus rhythm was  noted.  ______________________________________________________________________________  MMode/2D Measurements & Calculations     IVSd: 0.92 cm  LVIDd: 4.9 cm  LVIDs: 4.2 cm  LVPWd: 0.86 cm  FS: 14.8 %  LV mass(C)d: 150.2 grams  LV mass(C)dI: 65.3 grams/m2  Ao root diam: 3.6 cm  asc Aorta Diam: 3.5 cm  LVOT diam: 2.1 cm  LVOT area: 3.5 cm2  LA Volume (BP): 83.8 ml  LA Volume Index (BP): 36.4 ml/m2  RWT: 0.35     Doppler Measurements & Calculations  MV E max keenan: 50.9 cm/sec  MV A max keenan: 79.5 cm/sec  MV E/A: 0.64  MV dec slope: 324.5 cm/sec2  Ao V2 max: 124.9 cm/sec  Ao max P.0 mmHg  ASIF(V,D): 2.7 cm2  LV V1 max PG: 3.7 mmHg  LV V1 max: 96.5 cm/sec  LV V1 VTI: 19.6 cm  SV(LVOT): 68.7 ml  SI(LVOT): 29.9 ml/m2  PA V2 max: 85.5 cm/sec  PA max P.9 mmHg  PA acc time: 0.08 sec  TR max keenan: 236.5 cm/sec  TR max P.4 mmHg  AV Keenan Ratio (DI): 0.77  E/E' av.6  Lateral E/e': 7.9  Medial E/e': 9.4     ______________________________________________________________________________  Report approved by: Minesh Pearson 2022 11:23 AM         MR Cardiac w contrast    Narrative                                                     [facility]                                                     CMR Report      MRN:                  [id number]                                  Name:        NAY KWONG                                  :                  1960                                  Scan Date:   2022 14:29:05                                  Electronically signed by Franklyn Truong 2022-Mar-08 17:03:04    VITALS   ==========================================================================================================    HEIGHT: 72.00 in    (182.88 cm)  WEIGHT: 240.00 lbs    (108.86 kgs)  BSA: 2.30 m^2    FINAL IMPRESSION   ==========================================================================================================    Normal size left ventricle with mildly decreased LV  systolic function. Quantitative LVEF 48 %.  Normal size right ventricle with mildly reduced RV systolic function. Quantitative RVEF 51 %.  Delayed enhancement reveals small area of sub-epicardial hyperenhancement consistent with small, non CAD  scar in the distal lateral wall. This could be due to myocarditis.   Normal pericardial thickness without hyperenhancement. Small pericardial and bilateral small pleural  effusions noted.    SUMMARY   ==========================================================================================================    LEFT VENTRICLE: LV wall thickness is normal. Quantitative LVEF 48 %. LV cavity size is normal. LV systolic function is  mildly decreased globally.     VIABILITY: Delayed enhancement reveals small area of sub-epicardial hyperenhancement consistent with small, non CAD  scar in the distal lateral wall. This could be due to myocarditis.     RIGHT VENTRICLE: RV cavity size is normal. RV systolic function is mildly decreased globally. Quantitative RVEF 51 %.     LEFT ATRIUM: LA cavity size is normal.    RIGHT ATRIUM: RA cavity size is normal.    PERICARDIUM: There is a small pericardial effusion. Free breathing does not reveal any evidence of interventricular  dependence. IVC is normal in size.     PLEURAL EFFUSION: There is a small right pleural effusion. There is a small left pleural effusion.    AORTIC VALVE: Aortic valve is trileaflet.    AORTIC ROOT: The aortic root is normal in size.      CORE EXAM   ==========================================================================================================    MEASUREMENTS   ----------------------------------------------------------------------------------------      VOLUMETRIC ANALYSIS       ----------------------------------------------  .---------------------------------------------------------.                   LV    Reference   RV    Reference    +-----+-----------+------+------------+------+------------+    EDV  ml          192   (109-191)   183   (105-205)         ml/m^2       84   (60-95)      80   ()     ESV  ml          101   (27-72)      89   (20-80)           ml/m^2       44   (14-36)      39   (11-40)      CO   L/min      6.80              6.94                     L/min/m^2  2.95              3.01                SV   ml           92   ()     94   ()          ml/m^2       40   (40-64)      41   (37-69)      EF   %            48   (58-76)      51   (55-81)     '-----+-----------+------+------------+------+------------'            CARDIAC OUTPUT HR:  74 bpm      LV DIMENSIONS       ----------------------------------------------          WALL THICKNESS - ANTEROSEPTAL:  1.4 cm          WALL THICKNESS - INFEROLATERAL:  1.1 cm          LV NELSON:  5.3 cm        LA DIMENSIONS (LV SYSTOLE)       ----------------------------------------------          DIAMETER:  3.9 cm        AORTIC ROOT DIMENSIONS       ----------------------------------------------          ANNULUS:  3.5 cm        SCAN INFO   ==========================================================================================================    GENERAL   ----------------------------------------------------------------------------------------      SCANNER       ----------------------------------------------          :  SIEMENS          MODEL:  Aera          PULSE SEQUENCES:  SSFP cine, 2D LGE segmented, 2D LGE single-shot, T1-weighted imaging, T2-weighted          imaging, HASTE morphology         CONTRAST AGENT       ----------------------------------------------          TYPE:  Gadavist          GD CONCENTRATION:  0.5 M          VOLUME ADMINISTERED:  11 ml          DOSAGE:  0.05 mmol/kg        SETUP       ----------------------------------------------          REASON(S) FOR SCAN:  Acute pericarditis, Pericardial effusion, Myocarditis (known/suspect)           ATTENDING  PHYSICIAN:  CHRISTY CHO        BILLING   ==========================================================================================================    CPT Codes  ICD10 Codes      Report generated by [employer], a product of [employer]     Telemetry:

## 2022-03-10 ENCOUNTER — APPOINTMENT (OUTPATIENT)
Dept: PHYSICAL THERAPY | Facility: CLINIC | Age: 62
DRG: 281 | End: 2022-03-10
Attending: INTERNAL MEDICINE
Payer: COMMERCIAL

## 2022-03-10 ENCOUNTER — APPOINTMENT (OUTPATIENT)
Dept: CARDIOLOGY | Facility: CLINIC | Age: 62
DRG: 281 | End: 2022-03-10
Attending: INTERNAL MEDICINE
Payer: COMMERCIAL

## 2022-03-10 VITALS
HEART RATE: 78 BPM | DIASTOLIC BLOOD PRESSURE: 75 MMHG | WEIGHT: 237.2 LBS | OXYGEN SATURATION: 98 % | BODY MASS INDEX: 32.13 KG/M2 | RESPIRATION RATE: 16 BRPM | SYSTOLIC BLOOD PRESSURE: 131 MMHG | HEIGHT: 72 IN | TEMPERATURE: 98.1 F

## 2022-03-10 LAB
ANION GAP SERPL CALCULATED.3IONS-SCNC: 4 MMOL/L (ref 3–14)
BUN SERPL-MCNC: 10 MG/DL (ref 7–30)
CALCIUM SERPL-MCNC: 8.7 MG/DL (ref 8.5–10.1)
CHLORIDE BLD-SCNC: 107 MMOL/L (ref 94–109)
CO2 SERPL-SCNC: 27 MMOL/L (ref 20–32)
CREAT SERPL-MCNC: 0.96 MG/DL (ref 0.66–1.25)
ERYTHROCYTE [DISTWIDTH] IN BLOOD BY AUTOMATED COUNT: 12.1 % (ref 10–15)
GFR SERPL CREATININE-BSD FRML MDRD: 88 ML/MIN/1.73M2
GLUCOSE BLD-MCNC: 100 MG/DL (ref 70–99)
HCT VFR BLD AUTO: 38.6 % (ref 40–53)
HGB BLD-MCNC: 12.9 G/DL (ref 13.3–17.7)
LVEF ECHO: NORMAL
MCH RBC QN AUTO: 31.2 PG (ref 26.5–33)
MCHC RBC AUTO-ENTMCNC: 33.4 G/DL (ref 31.5–36.5)
MCV RBC AUTO: 94 FL (ref 78–100)
PLATELET # BLD AUTO: 246 10E3/UL (ref 150–450)
POTASSIUM BLD-SCNC: 4.1 MMOL/L (ref 3.4–5.3)
RBC # BLD AUTO: 4.13 10E6/UL (ref 4.4–5.9)
SODIUM SERPL-SCNC: 138 MMOL/L (ref 133–144)
WBC # BLD AUTO: 8.1 10E3/UL (ref 4–11)

## 2022-03-10 PROCEDURE — 36415 COLL VENOUS BLD VENIPUNCTURE: CPT | Performed by: INTERNAL MEDICINE

## 2022-03-10 PROCEDURE — 93325 DOPPLER ECHO COLOR FLOW MAPG: CPT

## 2022-03-10 PROCEDURE — 99239 HOSP IP/OBS DSCHRG MGMT >30: CPT | Performed by: INTERNAL MEDICINE

## 2022-03-10 PROCEDURE — 250N000013 HC RX MED GY IP 250 OP 250 PS 637: Performed by: INTERNAL MEDICINE

## 2022-03-10 PROCEDURE — 85027 COMPLETE CBC AUTOMATED: CPT | Performed by: INTERNAL MEDICINE

## 2022-03-10 PROCEDURE — 93325 DOPPLER ECHO COLOR FLOW MAPG: CPT | Mod: 26 | Performed by: INTERNAL MEDICINE

## 2022-03-10 PROCEDURE — 93321 DOPPLER ECHO F-UP/LMTD STD: CPT | Mod: 26 | Performed by: INTERNAL MEDICINE

## 2022-03-10 PROCEDURE — 93308 TTE F-UP OR LMTD: CPT | Mod: 26 | Performed by: INTERNAL MEDICINE

## 2022-03-10 PROCEDURE — 93308 TTE F-UP OR LMTD: CPT

## 2022-03-10 PROCEDURE — 97161 PT EVAL LOW COMPLEX 20 MIN: CPT | Mod: GP | Performed by: PHYSICAL THERAPIST

## 2022-03-10 PROCEDURE — 82310 ASSAY OF CALCIUM: CPT | Performed by: INTERNAL MEDICINE

## 2022-03-10 PROCEDURE — 99232 SBSQ HOSP IP/OBS MODERATE 35: CPT | Performed by: INTERNAL MEDICINE

## 2022-03-10 RX ORDER — IBUPROFEN 400 MG/1
400 TABLET, FILM COATED ORAL EVERY 6 HOURS PRN
Qty: 40 TABLET | Refills: 0 | Status: SHIPPED | OUTPATIENT
Start: 2022-03-10

## 2022-03-10 RX ORDER — COLCHICINE 0.6 MG/1
0.6 TABLET ORAL 2 TIMES DAILY
Qty: 28 TABLET | Refills: 0 | Status: SHIPPED | OUTPATIENT
Start: 2022-03-10 | End: 2022-03-24

## 2022-03-10 RX ORDER — DULOXETIN HYDROCHLORIDE 60 MG/1
60 CAPSULE, DELAYED RELEASE ORAL DAILY
Qty: 30 CAPSULE | Refills: 0 | Status: SHIPPED | OUTPATIENT
Start: 2022-03-10

## 2022-03-10 RX ORDER — APIXABAN 5 MG (74)
KIT ORAL
Qty: 74 EACH | Refills: 0 | Status: SHIPPED | OUTPATIENT
Start: 2022-03-10 | End: 2022-04-09

## 2022-03-10 RX ORDER — CEPHALEXIN 500 MG/1
500 CAPSULE ORAL EVERY 8 HOURS
Qty: 24 CAPSULE | Refills: 0 | Status: SHIPPED | OUTPATIENT
Start: 2022-03-10 | End: 2022-03-18

## 2022-03-10 RX ORDER — ASPIRIN 81 MG/1
81 TABLET ORAL DAILY
Qty: 30 TABLET | Refills: 0 | Status: SHIPPED | OUTPATIENT
Start: 2022-03-10

## 2022-03-10 RX ORDER — METOPROLOL TARTRATE 25 MG/1
25 TABLET, FILM COATED ORAL 2 TIMES DAILY
Qty: 60 TABLET | Refills: 0 | Status: SHIPPED | OUTPATIENT
Start: 2022-03-10 | End: 2022-03-24

## 2022-03-10 RX ADMIN — APIXABAN 10 MG: 5 TABLET, FILM COATED ORAL at 12:20

## 2022-03-10 RX ADMIN — CEPHALEXIN 500 MG: 500 CAPSULE ORAL at 12:21

## 2022-03-10 RX ADMIN — COLCHICINE 0.6 MG: 0.6 TABLET, FILM COATED ORAL at 08:31

## 2022-03-10 RX ADMIN — CEPHALEXIN 500 MG: 500 CAPSULE ORAL at 06:04

## 2022-03-10 RX ADMIN — METOPROLOL TARTRATE 25 MG: 25 TABLET, FILM COATED ORAL at 08:31

## 2022-03-10 ASSESSMENT — ACTIVITIES OF DAILY LIVING (ADL)
ADLS_ACUITY_SCORE: 8

## 2022-03-10 NOTE — PROGRESS NOTES
Care Management Discharge Note    Discharge Date: 03/10/2022       Discharge Disposition: Home    Discharge Services:      Discharge DME:      Discharge Transportation: car, drives self    Private pay costs discussed: Not applicable    PAS Confirmation Code:    Patient/family educated on Medicare website which has current facility and service quality ratings:      Education Provided on the Discharge Plan:  yes  Persons Notified of Discharge Plans: Cardiology clinic  Patient/Family in Agreement with the Plan:  yes    Handoff Referral Completed: No    Additional Information:  Patient to discharge home with follow up with cardiology and echo.  Cardiology scheduled at [facility].  Clinic to call patient once echo can be arranged.    Shayna Red RN  Care Coordinator  [facility]  [phone] (text or call)

## 2022-03-10 NOTE — DISCHARGE SUMMARY
[facility]    Discharge Summary  Hospitalist    Date of Admission:  3/7/2022  Date of Discharge:  3/10/2022  1:44 PM  Discharging Provider: Janneth Andrews MD    Discharge Diagnoses   NSTEMI  Acute DVT of left lower extremity  Strep A infection    History of Present Illness   Please review admission history and physical.    Hospital Course   Nasim Montez was admitted on 3/7/2022.  The following problems were addressed during his hospitalization:    Active Problems:    NSTEMI (non-ST elevated myocardial infarction) (H)    Acute deep vein thrombosis (DVT) of calf muscle vein of left lower extremity (H)  Nasim Montez is a 62 year old male who presents with chest pain and shortness of breath.     Non-ST elevation MI  Possible pericarditis/myopericarditis  -Patient presents with concerning symptoms of chest pain and dyspnea, troponin is trending up, CT chest negative for PE.  --Appreciate cardiology input, patient had cardiac MRI on 3/8, possibility of myocarditis noted.  --Patient underwent coronary angiogram on 3/9 which did not indicate any significant CAD.  He had a chest pain prior to the angiogram.  --Continue heparin drip, echocardiogram results noted,Left ventricular systolic function is moderately reduced. The visual ejectionfraction is 30-35%. There is moderate global hypokinesia of the left ventricle noted.  Mild MR and TR noted.  -Continue aspirin 81 mg daily, Lipitor 40 mg, continue low-dose beta-blockers.  -Cardiology has started patient on colchicine, plan is to do outpatient follow-up.  His repeat echo showed improved LVEF and RV.        Left posterior tibial veins DVT  -This was diagnosed at [facility] on 3/5/2022  -Patient was prescribed Lovenox/warfarin and discharged from the emergency room, however he was not able to fill it due to cost.  Discussion with pharmacy liaison, patient was started on Eliquis at the time of discharge, [facility] days of prescription done here.  Was on heparin drip during his stay here.  I know  Group A strep infection  --Continue Keflex to complete 10-day course.  ?  Left lower extremity cellulitis.   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  -Patient underwent extensive above procedure at [facility] on 1/18/2022  -Patient was also found to have left lower extremity cellulitis during the ER visit on 3/5 and was discharged on Keflex.  Patient did not fill his Keflex either, mild erythema noted.  -Patient is on Keflex for strep, continue that to complete 10-day course.     Indeterminate right pulmonary nodules   -CT chest revealed 2 pulmonary nodules, largest one at 0.6 cm. Follow-up with CT in 6 to 12 months.     Mild hyponatremia  -Resolved     BPH  -Continue prior to admission Flomax     PTSD  Anxiety  -Resume PTA Cymbalta, nortriptyline, Klonopin, Risperdal and trazodone once verified      Janneth Andrews MD    Significant Results and Procedures       Pending Results   These results will be followed up by cardiology on follow-up  Unresulted Labs Ordered in the Past 30 Days of this Admission     Date and Time Order Name Status Description    3/9/2022  6:00 PM Coxsackie A Antibodies (Serotypes 2,4,7,9,10,16), Serum In process     3/7/2022  3:17 PM Blood Culture Peripheral Blood Preliminary     3/7/2022  3:17 PM Blood Culture Peripheral Blood Preliminary           Code Status   Full Code       Primary Care Physician   Benjamin E Van Vranken    Physical Exam                      Vitals:    03/07/22 1439 03/09/22 0503   Weight: 108.9 kg (240 lb) 107.6 kg (237 lb 3.2 oz)     Vital Signs with Ranges     I/O last 3 completed shifts:  In: 2204.25 [P.O.:1320;  I.V.:884.25]  Out: 2000 [Urine:2000]    The patient was examined on the day of discharge.    Discharge Disposition   Discharged to home  Condition at discharge: Stable    Consultations This Hospital Stay   CARDIAC REHAB IP CONSULT  CARDIOLOGY IP CONSULT  PHARMACY IP CONSULT  PHARMACY IP CONSULT  CARE MANAGEMENT / SOCIAL WORK IP CONSULT  PHARMACY LIAISON FOR MEDICATION COVERAGE CONSULT  PHARMACY IP CONSULT  PHARMACY IP CONSULT  SMOKING CESSATION PROGRAM IP CONSULT  SMOKING CESSATION PROGRAM IP CONSULT    Time Spent on this Encounter   IJanneth MD, personally saw the patient today and spent greater than 30 minutes discharging this patient.    Discharge Orders      Follow-Up with Cardiology LOIDA      Reason for your hospital stay    Chest pain     Follow-up and recommended labs and tests     Follow up with primary care provider, Benjamin E Van Vranken, within 7 days for hospital follow- up.  No follow up labs or test are needed.  Follow-up with cardiology as recommended.     Activity    Your activity upon discharge: activity as tolerated     Echocardiogram Complete    Administration of IV contrast will be tailored to this examination per the appropriate written protocol listed in the Echocardiography department Protocol Book, or by the supervising Cardiologist. This may result in an order change.    Use of contrast is at the discretion of the supervising Cardiologist.     Diet    Follow this diet upon discharge: Orders Placed This Encounter      Low Saturated Fat Na <2400 mg     Discharge Medications   Discharge Medication List as of 3/10/2022 12:13 PM      START taking these medications    Details   Apixaban Starter Pack (ELIQUIS DVT/PE STARTER PACK) 5 MG TBPK Take 10 mg by mouth 2 times daily for 7 days, THEN 5 mg 2 times daily for 23 days., Disp-74 each, R-0, E-Prescribe      cephALEXin (KEFLEX) 500 MG capsule Take 1 capsule (500 mg) by mouth every 8 hours for 8 days, Disp-24 capsule, R-0, E-Prescribe       colchicine (COLCYRS) 0.6 MG tablet Take 1 tablet (0.6 mg) by mouth 2 times daily, Disp-28 tablet, R-0, E-Prescribe      ibuprofen (ADVIL/MOTRIN) 400 MG tablet Take 1 tablet (400 mg) by mouth every 6 hours as needed for moderate pain, Disp-40 tablet, R-0, E-Prescribe      metoprolol tartrate (LOPRESSOR) 25 MG tablet Take 1 tablet (25 mg) by mouth 2 times daily, Disp-60 tablet, R-0, E-Prescribe         CONTINUE these medications which have CHANGED    Details   aspirin 81 MG EC tablet Take 1 tablet (81 mg) by mouth daily, Disp-30 tablet, R-0, E-Prescribe      DULoxetine (CYMBALTA) 60 MG capsule Take 1 capsule (60 mg) by mouth daily, Disp-30 capsule, R-0, E-Prescribe         CONTINUE these medications which have NOT CHANGED    Details   acetaminophen (TYLENOL) 500 MG tablet Take 500-1,000 mg by mouth every 6 hours as needed for mild pain, Historical      celecoxib (CELEBREX) 200 MG capsule Take 200 mg by mouth 2 times daily as needed for moderate pain, Historical      clonazePAM (KLONOPIN) 0.5 MG tablet Take 0.5 mg by mouth At Bedtime , Historical      gabapentin (NEURONTIN) 100 MG capsule Take 300 mg by mouth 3 times daily as needed, Historical      Multiple Vitamins-Minerals (CENTRUM SILVER) per tablet Take 1 tablet by mouth daily, Historical      nortriptyline (PAMELOR) 25 MG capsule Take 25 mg by mouth At Bedtime, Historical      ondansetron (ZOFRAN-ODT) 8 MG ODT tab Take 8 mg by mouth every 8 hours as needed for nausea, Historical      oxyCODONE (OXY-IR) 5 MG capsule Take 5 mg by mouth every 4 hours as needed for severe pain, Historical      risperiDONE (RISPERDAL) 0.5 MG tablet Take 0.5 mg by mouth 2 times daily, Historical      simvastatin (ZOCOR) 40 MG tablet Take 20 mg by mouth At Bedtime, Historical      tamsulosin (FLOMAX) 0.4 MG capsule Take 0.4 mg by mouth daily, Historical      traZODone (DESYREL) 100 MG tablet Take 200 mg by mouth nightly as needed , Historical         STOP taking these medications        enoxaparin ANTICOAGULANT (LOVENOX) 120 MG/0.8ML syringe Comments:   Reason for Stopping:         naproxen (NAPROSYN) 500 MG tablet Comments:   Reason for Stopping:             Allergies   Allergies   Allergen Reactions     Iodine Rash     Topical      Data   Most Recent 3 CBC's:Recent Labs   Lab Test 03/10/22  0550 03/08/22  0657 03/07/22  2351   WBC 8.1 9.8 10.7   HGB 12.9* 12.8* 13.0*   MCV 94 94 95    198 191      Most Recent 3 BMP's:  Recent Labs   Lab Test 03/10/22  0550 03/09/22  0848 03/08/22  0657 03/07/22  1517 03/07/22  1443     --  135  --  131*   POTASSIUM 4.1 3.8 3.8  --  3.5   CHLORIDE 107  --  104  --  101   CO2 27  --  26  --  25   BUN 10  --  9  --  9   CR 0.96  --  0.82 0.9 0.93   ANIONGAP 4  --  5  --  5   IBIS 8.7  --  8.5  --  8.7   *  --  124*  --  134*     Most Recent 2 LFT's:  Recent Labs   Lab Test 03/08/22  0657 06/02/17  1610   AST 32 18   ALT 41 18   ALKPHOS 76 49   BILITOTAL 0.9 0.4     Most Recent INR's and Anticoagulation Dosing History:  Anticoagulation Dose History    There is no flowsheet data to display.       Most Recent 3 Troponin's:  Recent Labs   Lab Test 04/29/20  0151 04/28/20  2259 06/02/17  1610   TROPI 0.024 0.024 0.042     Most Recent Cholesterol Panel:  Recent Labs   Lab Test 03/08/22  0657   CHOL 166   LDL 98   HDL 34*   TRIG 169*     Most Recent 6 Bacteria Isolates From Any Culture (See EPIC Reports for Culture Details):No lab results found.  Most Recent TSH, T4 and A1c Labs:No lab results found.  Results for orders placed or performed during the hospital encounter of 03/07/22   Head CT w/o contrast    Narrative    CT HEAD W/O CONTRAST 3/7/2022 3:43 PM    INDICATION: Headache, intracranial hemorrhage suspected  TECHNIQUE: CT scan of the head without contrast. Dose reduction  techniques were used.  CONTRAST:  None.  COMPARISON: None.    FINDINGS:   No evidence of acute parenchymal hemorrhage or mass effect. Brain  attenuation morphology are normal.  The ventricles and sulci are normal  for age. Normal gray-white matter differentiation. The basal cisterns  are patent.    The globes are unremarkable. The partially imaged paranasal sinuses,  as are air cells and middle ear cavities are unremarkable. The  visualized skull base and calvarium are unremarkable.      Impression    IMPRESSION:  1.  No evidence of acute intracranial hemorrhage or mass effect.    ARYA VILLEGAS MD         SYSTEM ID:  [id number]   CT Chest Pulmonary Embolism w Contrast    Narrative    CT CHEST PULMONARY EMBOLISM WITH CONTRAST 3/7/2022 3:51 PM    CLINICAL HISTORY: Shortness of breath. Deep venous thrombosis.  TECHNIQUE: CT angiogram chest during arterial phase injection IV  contrast. 2D and 3D MIP reconstructions were performed by the CT  technologist. Dose reduction techniques were used.   CONTRAST: 79mL [id number]      COMPARISON: None.    FINDINGS:  ANGIOGRAM CHEST: No evidence for pulmonary embolism. Thoracic aorta is  negative for dissection. No CT evidence of right heart strain.    LUNGS AND PLEURA: Trace amount of pleural fluid is noted bilaterally.  Mild scarring and/or atelectasis in the lingula. 0.6 cm indeterminate  pulmonary nodule along the right minor fissure (series 5 image 159)  indeterminate 0.5 cm pulmonary nodule in the right middle lobe (series  5 image 165).    MEDIASTINUM/AXILLAE: No enlarged lymph nodes in the chest. No  pericardial effusion.    CORONARY ARTERY CALCIFICATION: None.    UPPER ABDOMEN: Small hiatal hernia. Limited views of the upper abdomen  are otherwise unremarkable.    MUSCULOSKELETAL: Degenerative changes in the thoracic spine.      Impression    IMPRESSION:  1.  No evidence for pulmonary embolism.  2.  Trace amount of pleural fluid bilaterally.  3.  There are two indeterminate pulmonary nodules in the right lung,  with the largest measuring 0.6 cm. Please refer to pulmonary nodule  follow-up guidelines below.    Recommendations for an incidental  lung nodule = or > 6mm to 8mm:    Low risk patients: Initial follow-up CT at 6-12 months, then  consider CT at 18-24 months if no change.    High risk patients: Initial follow-up CT at 6-12 months, then CT at  18-24 months if no change.    *Low Risk: Minimal or absent history of smoking or other known risk  factors.  *Nonsolid (ground-glass) or partly solid nodules may require longer  follow-up to exclude indolent adenocarcinoma.  *Recommendations based on Guidelines for the Management of Incidental  Pulmonary Nodules Detected at CT: From the Fleischner Society 2017,  Radiology 2017.  *Guidelines apply to incidental nodules in patients who are 35 years  or older.  *Guidelines do not apply to lung cancer screening, patients with  immunosuppression, or patients with known primary cancer.    LIZETTE WALTON MD         SYSTEM ID:  [id number]   Echocardiogram Complete     Value    LVEF  30-35%    Biplane LVEF 35%    Narrative    [id number]  [id number]  [id number]  [id number]^RITA^RAMIRO     [facility]  Echocardiography Laboratory  [address] [place] [address]     Name: NAY KWONG  MRN: [id number]  : 1960  Study Date: 2022 10:09 AM  Age: 62 yrs  Gender: Male  Patient Location: [facility]  Reason For Study: MI - Acute  Ordering Physician: RAMIRO SALINAS  Referring Physician: RAMIRO SALINAS  Performed By: Tiffanie Koenig     BSA: 2.3 m2  Height: 72 in  Weight: 240 lb  HR: 77  ______________________________________________________________________________  Procedure  Complete Portable Echo Adult. Optison (NDC #[id number]) given intravenously.  ______________________________________________________________________________  Interpretation Summary     Left ventricular systolic function is moderately reduced. The visual ejection  fraction is 30-35%. There is moderate global hypokinesia of the left  ventricle.  The left atrium is mildly dilated. Right atrial size is normal.  There is  mild (1+) tricuspid and mitral regurgitation.  No prior study for comparison. Technically adequate study.  ______________________________________________________________________________  Left Ventricle  The left ventricle is normal in size. Left ventricular systolic function is  moderately reduced. The visual ejection fraction is 30-35%. Biplane LVEF is  35%. Grade I or early diastolic dysfunction. There is moderate global  hypokinesia of the left ventricle.     Right Ventricle  The right ventricle is normal in size and function.     Atria  The left atrium is mildly dilated. Right atrial size is normal.     Mitral Valve  There is mild (1+) mitral regurgitation.     Tricuspid Valve  There is mild (1+) tricuspid regurgitation. The right ventricular systolic  pressure is approximated at 22.4 mmHg plus the right atrial pressure.     Aortic Valve  Normal tricuspid aortic valve. No aortic stenosis is present.     Pulmonic Valve  The pulmonic valve is not well visualized.     Vessels  Normal size aorta. IVC diameter <2.1 cm collapsing >50% with sniff suggests a  normal RA pressure of 3 mmHg.     Pericardium  There is no pericardial effusion.     Rhythm  Sinus rhythm was noted.  ______________________________________________________________________________  MMode/2D Measurements & Calculations     IVSd: 0.92 cm  LVIDd: 4.9 cm  LVIDs: 4.2 cm  LVPWd: 0.86 cm  FS: 14.8 %  LV mass(C)d: 150.2 grams  LV mass(C)dI: 65.3 grams/m2  Ao root diam: 3.6 cm  asc Aorta Diam: 3.5 cm  LVOT diam: 2.1 cm  LVOT area: 3.5 cm2  LA Volume (BP): 83.8 ml  LA Volume Index (BP): 36.4 ml/m2  RWT: 0.35     Doppler Measurements & Calculations  MV E max geraldine: 50.9 cm/sec  MV A max geraldine: 79.5 cm/sec  MV E/A: 0.64  MV dec slope: 324.5 cm/sec2  Ao V2 max: 124.9 cm/sec  Ao max P.0 mmHg  ASIF(V,D): 2.7 cm2  LV V1 max PG: 3.7 mmHg  LV V1 max: 96.5 cm/sec  LV V1 VTI: 19.6 cm  SV(LVOT): 68.7 ml  SI(LVOT): 29.9 ml/m2  PA V2 max: 85.5 cm/sec  PA max P.9  mmHg  PA acc time: 0.08 sec  TR max keenan: 236.5 cm/sec  TR max P.4 mmHg  AV Keenan Ratio (DI): 0.77  E/E' av.6  Lateral E/e': 7.9  Medial E/e': 9.4     ______________________________________________________________________________  Report approved by: Minesh Pearson 2022 11:23 AM         Echocardiogram Limited     Value    LVEF  50-55%    Narrative    [id number]  [id number]  [id number]  [id number]^WISAM^AMY^SONIYA     [facility]  Echocardiography Laboratory  [address] [place] [address]     Name: NAY KWONG  MRN: [id number]  : 1960  Study Date: 03/10/2022 07:54 AM  Age: 62 yrs  Gender: Male  Patient Location: [facility]  Reason For Study: Myocarditis  Ordering Physician: MAY JOEL  Performed By: Joon Goodrich     BSA: 2.3 m2  Height: 72 in  Weight: 237 lb  HR: 78  BP: 105/60 mmHg  ______________________________________________________________________________  Procedure  Limited Portable Echo Adult.  ______________________________________________________________________________  Interpretation Summary     1. The visual ejection fraction is 50-55%. Mild hypokinesis of basal to mid  inferior wall.  2. The right ventricle is normal in structure, function and size.  3. No valve disease.     Echo from 3-8-22 showed EF 30-35%.  ______________________________________________________________________________  Left Ventricle  The left ventricle is normal in size. The visual ejection fraction is 50-55%.  Mild hypokinesis of basal to mid inferior wall.     Right Ventricle  The right ventricle is normal in structure, function and size.     Mitral Valve  There is mild (1+) mitral regurgitation.     Tricuspid Valve  There is mild (1+) tricuspid regurgitation. The right ventricular systolic  pressure is approximated at 20.8 mmHg plus the right atrial pressure.     Pericardium  The pericardium appears normal.     Rhythm  Sinus rhythm was  noted.  ______________________________________________________________________________  MMode/2D Measurements & Calculations  IVSd: 1.1 cm  LVIDd: 4.6 cm  LVIDs: 3.2 cm  LVPWd: 1.1 cm  FS: 31.9 %  LV mass(C)d: 190.3 grams  LV mass(C)dI: 83.1 grams/m2  LA dimension: 3.4 cm  RWT: 0.49     Doppler Measurements & Calculations  TR max geraldine: 228.0 cm/sec  TR max P.8 mmHg     ______________________________________________________________________________  Report approved by: Minesh Choi 03/10/2022 08:28 AM         Cardiac Catheterization     Value    Cath EF Estimated 45    Narrative      Left ventricular filling pressures are severely elevated.    The ejection fraction is 40-50% by visual estimate.     1.  Essentially normal coronary arteries in a codominant circulation with   a ramus intermedius.  2.  Left ventricular end-diastolic pressure 25 mmHg.  Mild global   hypokinesia estimate ejection fraction of 45%.  Trace mitral   regurgitation.  No gradient across aortic valve upon pullback.     MR Cardiac w contrast    Narrative                                                     [facility]                                                     CMR Report      MRN:                  [id number]                                  Name:        NAY KWONG                                  :                  1960                                  Scan Date:   2022 14:29:05                                  Electronically signed by Franklyn Truong 2022-Mar-08 17:03:04    VITALS   ==========================================================================================================    HEIGHT: 72.00 in    (182.88 cm)  WEIGHT: 240.00 lbs    (108.86 kgs)  BSA: 2.30 m^2    FINAL IMPRESSION   ==========================================================================================================    Normal size left ventricle with mildly decreased LV systolic function. Quantitative LVEF 48 %.  Normal  size right ventricle with mildly reduced RV systolic function. Quantitative RVEF 51 %.  Delayed enhancement reveals small area of sub-epicardial hyperenhancement consistent with small, non CAD  scar in the distal lateral wall. This could be due to myocarditis.   Normal pericardial thickness without hyperenhancement. Small pericardial and bilateral small pleural  effusions noted.    SUMMARY   ==========================================================================================================    LEFT VENTRICLE: LV wall thickness is normal. Quantitative LVEF 48 %. LV cavity size is normal. LV systolic function is  mildly decreased globally.     VIABILITY: Delayed enhancement reveals small area of sub-epicardial hyperenhancement consistent with small, non CAD  scar in the distal lateral wall. This could be due to myocarditis.     RIGHT VENTRICLE: RV cavity size is normal. RV systolic function is mildly decreased globally. Quantitative RVEF 51 %.     LEFT ATRIUM: LA cavity size is normal.    RIGHT ATRIUM: RA cavity size is normal.    PERICARDIUM: There is a small pericardial effusion. Free breathing does not reveal any evidence of interventricular  dependence. IVC is normal in size.     PLEURAL EFFUSION: There is a small right pleural effusion. There is a small left pleural effusion.    AORTIC VALVE: Aortic valve is trileaflet.    AORTIC ROOT: The aortic root is normal in size.      CORE EXAM   ==========================================================================================================    MEASUREMENTS   ----------------------------------------------------------------------------------------      VOLUMETRIC ANALYSIS       ----------------------------------------------  .---------------------------------------------------------.                   LV    Reference   RV    Reference    +-----+-----------+------+------------+------+------------+   EDV  ml          192   (678-264)   183    (105-205)         ml/m^2       84   (60-95)      80   ()     ESV  ml          101   (27-72)      89   (20-80)           ml/m^2       44   (14-36)      39   (11-40)      CO   L/min      6.80              6.94                     L/min/m^2  2.95              3.01                SV   ml           92   ()     94   ()          ml/m^2       40   (40-64)      41   (37-69)      EF   %            48   (58-76)      51   (55-81)     '-----+-----------+------+------------+------+------------'            CARDIAC OUTPUT HR:  74 bpm      LV DIMENSIONS       ----------------------------------------------          WALL THICKNESS - ANTEROSEPTAL:  1.4 cm          WALL THICKNESS - INFEROLATERAL:  1.1 cm          LV NELSON:  5.3 cm        LA DIMENSIONS (LV SYSTOLE)       ----------------------------------------------          DIAMETER:  3.9 cm        AORTIC ROOT DIMENSIONS       ----------------------------------------------          ANNULUS:  3.5 cm        SCAN INFO   ==========================================================================================================    GENERAL   ----------------------------------------------------------------------------------------      SCANNER       ----------------------------------------------          :  SIEMENS          MODEL:  Aera          PULSE SEQUENCES:  SSFP cine, 2D LGE segmented, 2D LGE single-shot, T1-weighted imaging, T2-weighted          imaging, HASTE morphology         CONTRAST AGENT       ----------------------------------------------          TYPE:  Gadavist          GD CONCENTRATION:  0.5 M          VOLUME ADMINISTERED:  11 ml          DOSAGE:  0.05 mmol/kg        SETUP       ----------------------------------------------          REASON(S) FOR SCAN:  Acute pericarditis, Pericardial effusion, Myocarditis (known/suspect)           ATTENDING PHYSICIAN:  CHRISTY NATH    ==========================================================================================================    CPT Codes  ICD10 Codes      Report generated by [employer], a product of [employer]

## 2022-03-10 NOTE — PLAN OF CARE
Cognition/Mentation: A/O x4    Neuro/CMS: Intact    VS: Stable, RA    Cardiac: NSR, denies CP    Respiratory: LS clear, respirations regular/non-labored    GI/: continent, using urinal    Activity: SBA to bathroom    Pain: denies    Drains/Tubes: PIV SL    Skin: Left radial site CDI    Disposition: plan to discharge home today

## 2022-03-10 NOTE — PROGRESS NOTES
03/10/22 1047   Quick Adds   Type of Visit Initial PT Evaluation   Living Environment   People in Home alone   Current Living Arrangements apartment  (55 and up)   Home Accessibility no concerns   Transportation Anticipated car, drives self   Living Environment Comments I with all ADL's/IADL's prior to admission. Son does help some at grocery store and laundry mat.    Self-Care   Activity/Exercise/Self-Care Comment Good until Jan 18 when he had surgery. Fell on ice Used a walker until MD said he could be WBAT. Has been WBAT for about 2 weeks.    General Information   Onset of Illness/Injury or Date of Surgery 03/07/22   Referring Physician Carlos Bustamante MD   Patient/Family Therapy Goals Statement (PT) To go home   Pertinent History of Current Problem (include personal factors and/or comorbidities that impact the POC) Nasim Montez is a 62 year old male with past medical history of LLE DVT (New), LLE compartment syndrome with extensive surgery, IVONE, previous alcohol abuse sober since 2016, PTSD, dyslipidemia, strep throat.  This patient was admitted on 3/7/2022 with symptoms of fever, chest pain, sob.   Existing Precautions/Restrictions no known precautions/restrictions   Cognition   Orientation Status (Cognition) oriented x 4   Pain Assessment   Patient Currently in Pain No   Integumentary/Edema   Integumentary/Edema Comments Left LE swelling from recent DVT/compartment syndrome.    Posture    Posture Not impaired   Range of Motion (ROM)   Range of Motion ROM is WNL   Strength (Manual Muscle Testing)   Strength Comments No formal deficits noted. Left ankle not formally tested.    Bed Mobility   Comment, (Bed Mobility) Independent   Transfers   Comment, (Transfers) Independent   Gait/Stairs (Locomotion)   Comment, (Gait/Stairs) Independent   Balance   Balance no deficits were identified   Sensory Examination   Sensory Perception WNL   Clinical Impression   Criteria for Skilled Therapeutic Intervention No  problems identified which require skilled intervention   Clinical Presentation (PT Evaluation Complexity) Stable/Uncomplicated   Clinical Decision Making (Complexity) low complexity   PT Discharge Planning   PT Discharge Recommendation (DC Rec) home   PT Brief overview of current status Safe to be independent without AD.   Total Evaluation Time   Total Evaluation Time (Minutes) 20

## 2022-03-10 NOTE — DISCHARGE INSTRUCTIONS
Cardiac Angiogram Discharge Instructions - Radial    After you go home:      Have an adult stay with you until tomorrow.    Drink extra fluids for 2 days.    You may resume your normal diet.    No smoking       For 24 hours - due to the sedation you received:    Relax and take it easy.    Do NOT make any important or legal decisions.    Do NOT drive or operate machines at home or at work.    Do NOT drink alcohol.    Care of Wrist Puncture Site:      For the first 24 hrs - check the puncture site every 1-2 hours while awake.    It is normal to have soreness at the puncture site and mild tingling in your hand for up to 3 days.    Remove the bandaid after 24 hours. If there is minor oozing, apply another bandaid and remove it after 12 hours.    You may shower tomorrow.  Do NOT take a bath, or use a hot tub or pool for at least 3 days. Do NOT scrub the site. Do not use lotion or powder near the puncture site.           Activity:        For 2 days:     do not use your hand or arm to support your weight (such as rising from a chair)     do not bend your wrist (such as lifting a garage door).    do not lift more than 5 pounds or exercise your arm (such as tennis, golf or bowling).    Do NOT do any heavy activity such as exercise, lifting, or straining.     Bleeding:      If you start bleeding from the site in your wrist, sit down and press firmly on/above the site for 10 minutes.     Once bleeding stops, keep arm still for 2 hours.     Call [facility] as soon as you can.       Call 911 right away if you have heavy bleeding or bleeding that does not stop.      Medicines:      If you are taking an antiplatelet medication such as Plavix, Brilinta or Effient, do not stop taking it until you talk to your cardiologist.        If you are on Metformin (Glucophage), do not restart it until you have blood tests (within 2 to 3 days after discharge).  After you have your blood drawn, you may restart the Metformin.     Take your  medications, including blood thinners, unless your provider tells you not to.      If you take Coumadin (Warfarin), have your INR checked by your provider in  3-5 days. Call your clinic to schedule this.    If you have stopped any medicines, check with your provider about when to restart them.    Follow Up Appointments:      Follow up with [facility] Nurse Practitioner at [facility] of patient preference in 7-10 days.    Call the clinic if:      You have a large or growing hard lump around the site.    The site is red, swollen, hot or tender.    Blood or fluid is draining from the site.    You have chills or a fever greater than 101 F (38 C).    Your arm feels numb, cool or changes color.    You have hives, a rash or unusual itching.    Any questions or concerns.          [facility] [place]:    [phone] [facility] (7 days a week)

## 2022-03-10 NOTE — PLAN OF CARE
Goal Outcome Evaluation:    Plan of Care Reviewed With: patient, spouse        Discharge home tolerated food, denies pain, SR . Left radial site is dry and intact no bleeding no hematoma no bleeding. Up in the room independent with boot. All discharge questions answered.

## 2022-03-10 NOTE — PROGRESS NOTES
[facility]    Cardiology Progress Note    Date of Service: 03/10/2022   pt seen and examined by me today all observations and plans mine  HOME TODAY PER IM  Restart treva for dvt    Assessment & Plan   Nasim Montez is a 62 year old male with past medical history of LLE DVT (New), LLE compartment syndrome with extensive surgery, IVONE, previous alcohol abuse sober since 2016, PTSD, dyslipidemia, strep throat.  This patient was admitted on 3/7/2022 with symptoms of fever, chest pain, sob.      Nasim Montez is a 62 year old male who was admitted on 3/7/2022. I was asked to see the patient for elevated troponin and chest pain.     1. Chest pain with positive troponin in the setting of Strep throat  Pain is atypical in that it is non exertional and worse with deep breath and lying back  Troponin peak 4811  Fever, chills last Friday  Echo: biventricular global reduced function with modest valve dysfunction on echocardiogram  Repeat echo today -shows improved EF LV and RV  CMR:  Normal size left ventricle with mildly decreased LV systolic function.          Quantitative LVEF 48 %.  Normal size right ventricle with mildly reduced RV systolic function. Quantitative RVEF 51 %.  Delayed enhancement reveals small area of sub-epicardial hyperenhancement consistent with small, non CAD  scar in the distal lateral wall. This could be due to myocarditis.   Normal pericardial thickness without hyperenhancement. Small pericardial and bilateral small pleural  effusions noted.     Heart cath showed no significant CAD  LVEDP 25  had cp and ecg normal  Clinical history and exam and now neg cath but abnl MRI suggests this is myopericarditis    PLAN:  colchicine, standard chf meds -metoprolol 25mg bid;   Pt reports no cp now  Some diarrhea  I told pt to keep colchicne .6bid  But can drop to 0.3 bid if intolerable GI  -Did not order CRP/sed rate in the setting of strep throat-since that would make  it positive anyway    -Pt on keflex so probably no point in Blood cultures unless IM requests  Will order viral panel coxsackie or respiratory viral pattern--in process  Low protein and albumin, liver enzymes normal  Await final echo read to assure EF has not dropped  Hopefully home today  Follow up echo and office visit in two weeks  Dicussed no aerobic activity or heavy lifting for the next few weeks pending EF trend     2. Recent LLE DVT  Was On IV Heparin  Ok to start oral anticoagulation from our perspective for DVT-he came in on Lovenox     3. Compartment syndrome LLE with left ankle arthroscopy, treatment of chondral defect,syndesmosis revision fixation, repair of peroneal tendons, hindfoot ligament reconstruction, superficial peroneal nerve release, 4 compartment release of left leg at [facility] in [place] mid January.     4. PTSD, anxiety     5 alcohol use in past, dry since 2016     6. IVONE     7. Dyslipidemia  LDL 98 HDL 34  On simvastatin at home; changed to Crestor here but put on hold now due to drug drug interaction with Colchcine  Could restart statin when off of Colchine but no CAD so low dose would be fine or previous simvastatin     8. Impaired fasting glucose     9. Strep A  Afebrile, on antibiotics        Interval History     No SOB; chest pain resolved    Review of Systems:  The Review of Systems is negative other than noted in the HPI    Physical Exam   Temp: 98.1  F (36.7  C) Temp src: Oral BP: 126/84 Pulse: 82   Resp: 16 SpO2: 98 % O2 Device: None (Room air)    Vitals:    03/07/22 1439 03/09/22 0503   Weight: 108.9 kg (240 lb) 107.6 kg (237 lb 3.2 oz)     Vital Signs with Ranges  Temp:  [97.6  F (36.4  C)-98.2  F (36.8  C)] 98.1  F (36.7  C)  Pulse:  [69-83] 82  Resp:  [16] 16  BP: ()/(59-84) 126/84  SpO2:  [92 %-98 %] 98 %  I/O last 3 completed shifts:  In: 1421.25 [P.O.:540; I.V.:881.25]  Out: 700 [Urine:700]    Constitutional     alert and oriented, in no acute distress.      Skin     warm and dry to touch    ENT     no pallor or cyanosis    Neck    Supple, JVP normal, no carotid bruit    Chest     no tenderness to palpation     Lungs  clear to auscultation     Cardiac  regular rhythm, S1 normal, S2 normal, No S3 or S4, no murmurs, no rubs    Abdomen     abdomen soft, bowel sounds normoactive, no hepatosplenomegaly    Extremities and Back     no clubbing, cyanosis. Trace edema LLE (DVT) left rradial site clean without hum or bruit      Neurological     no gross motor deficits noted, affect appropriate, oriented to time, person and place.        Medications     - MEDICATION INSTRUCTIONS -       - MEDICATION INSTRUCTIONS -       - MEDICATION INSTRUCTIONS -       ACE/ARB/ARNI NOT PRESCRIBED       sodium chloride 75 mL/hr (03/09/22 1049)       cephALEXin  500 mg Oral Q8H WESLY     clonazePAM  0.5 mg Oral At Bedtime     colchicine  0.6 mg Oral BID     metoprolol tartrate  25 mg Oral BID     sodium chloride (PF)  3 mL Intracatheter Q8H          Data:     ROUTINE IP LABS (Last four results)  BMP  Recent Labs   Lab 03/10/22  0550 03/09/22  0848 03/08/22  0657 03/07/22  1517 03/07/22  1443     --  135  --  131*   POTASSIUM 4.1 3.8 3.8  --  3.5   CHLORIDE 107  --  104  --  101   IBIS 8.7  --  8.5  --  8.7   CO2 27  --  26  --  25   BUN 10  --  9  --  9   CR 0.96  --  0.82 0.9 0.93   *  --  124*  --  134*     CHOLESTEROL/HEPATIC  Recent Labs   Lab 03/08/22  0657   CHOL 166   TRIG 169*   LDL 98   HDL 34*   ALT 41   AST 32     CBC  Recent Labs   Lab 03/10/22  0550 03/08/22  0657 03/07/22  2351 03/07/22  1443   WBC 8.1 9.8 10.7 14.0*   RBC 4.13* 4.07* 4.10* 4.53   HGB 12.9* 12.8* 13.0* 14.3   HCT 38.6* 38.1* 38.9* 42.4   MCV 94 94 95 94   MCH 31.2 31.4 31.7 31.6   MCHC 33.4 33.6 33.4 33.7   RDW 12.1 12.3 12.3 12.3    198 191 198     TROP: No lab results found in last 7 days.   BNP:    Recent Labs   Lab 03/07/22  1443   NTBNPI 505     INRNo lab results found in last 7 days.  No  results found for: TSH    EKG results:  Reviewed if available     Imaging:  Recent Results (from the past 24 hour(s))   Cardiac Catheterization   Result Value    Cath EF Estimated 45    Narrative      Left ventricular filling pressures are severely elevated.    The ejection fraction is 40-50% by visual estimate.     1.  Essentially normal coronary arteries in a codominant circulation with   a ramus intermedius.  2.  Left ventricular end-diastolic pressure 25 mmHg.  Mild global   hypokinesia estimate ejection fraction of 45%.  Trace mitral   regurgitation.  No gradient across aortic valve upon pullback.     Echocardiogram Limited   Result Value    LVEF  50-55%    Narrative    [id number]  [id number]  [id number]  [id number]^WISAM^AMY^SONIYA     [facility]  Echocardiography Laboratory  [facility] [place] [address]     Name: NAY KWONG  MRN: [id number]  : 1960  Study Date: 03/10/2022 07:54 AM  Age: 62 yrs  Gender: Male  Patient [facility]  Reason For Study: Myocarditis  Ordering Physician: AMY JOEL  Performed By: Joon Goodrich     BSA: 2.3 m2  Height: 72 in  Weight: 237 lb  HR: 78  BP: 105/60 mmHg  ______________________________________________________________________________  Procedure  Limited Portable Echo Adult.  ______________________________________________________________________________  Interpretation Summary     1. The visual ejection fraction is 50-55%. Mild hypokinesis of basal to mid  inferior wall.  2. The right ventricle is normal in structure, function and size.  3. No valve disease.     Echo from 3-8-22 showed EF 30-35%.  ______________________________________________________________________________  Left Ventricle  The left ventricle is normal in size. The visual ejection fraction is 50-55%.  Mild hypokinesis of basal to mid inferior wall.     Right Ventricle  The right ventricle is normal in structure, function and size.     Mitral Valve  There is  mild (1+) mitral regurgitation.     Tricuspid Valve  There is mild (1+) tricuspid regurgitation. The right ventricular systolic  pressure is approximated at 20.8 mmHg plus the right atrial pressure.     Pericardium  The pericardium appears normal.     Rhythm  Sinus rhythm was noted.  ______________________________________________________________________________  MMode/2D Measurements & Calculations  IVSd: 1.1 cm  LVIDd: 4.6 cm  LVIDs: 3.2 cm  LVPWd: 1.1 cm  FS: 31.9 %  LV mass(C)d: 190.3 grams  LV mass(C)dI: 83.1 grams/m2  LA dimension: 3.4 cm  RWT: 0.49     Doppler Measurements & Calculations  TR max geraldine: 228.0 cm/sec  TR max P.8 mmHg     ______________________________________________________________________________  Report approved by: Minesh Choi 03/10/2022 08:28 AM

## 2022-03-11 ENCOUNTER — PATIENT OUTREACH (OUTPATIENT)
Dept: CARE COORDINATION | Facility: CLINIC | Age: 62
End: 2022-03-11

## 2022-03-11 DIAGNOSIS — Z71.89 OTHER SPECIFIED COUNSELING: ICD-10-CM

## 2022-03-11 NOTE — PROGRESS NOTES
Clinic Care Coordination Contact  [facility]/Voicemail      Clinical Data: Care Coordinator Outreach  Outreach attempted x 1.  Left message on patient's voicemail with call back information and requested return call.  Plan:  Care Coordinator will try to reach patient again in 1-2 business days.          BRYANNA Rubalcava  [phone]  [facility]

## 2022-03-12 LAB
ATRIAL RATE - MUSE: 70 BPM
BACTERIA BLD CULT: NO GROWTH
BACTERIA BLD CULT: NO GROWTH
DIASTOLIC BLOOD PRESSURE - MUSE: NORMAL MMHG
INTERPRETATION ECG - MUSE: NORMAL
P AXIS - MUSE: 62 DEGREES
PR INTERVAL - MUSE: 176 MS
QRS DURATION - MUSE: 102 MS
QT - MUSE: 396 MS
QTC - MUSE: 427 MS
R AXIS - MUSE: -15 DEGREES
SYSTOLIC BLOOD PRESSURE - MUSE: NORMAL MMHG
T AXIS - MUSE: 81 DEGREES
VENTRICULAR RATE- MUSE: 70 BPM

## 2022-03-12 NOTE — PROGRESS NOTES
Clinic Care Coordination Contact  [facility]/Voicemail       Clinical Data: Care Coordinator Outreach  Outreach attempted x 2.  Left message on patient's voicemail with call back information and requested return call.  Plan:  Care Coordinator will do no further outreaches at this time.    Georgie Alvarado  Community Health Worker  [facility]  [phone]

## 2022-03-15 ENCOUNTER — TELEPHONE (OUTPATIENT)
Dept: CARDIOLOGY | Facility: CLINIC | Age: 62
End: 2022-03-15

## 2022-03-15 NOTE — TELEPHONE ENCOUNTER
Patient was evaluated by cardiology while inpatient for fever, chest pain, SOB, elevated troponin-NSTEMI. 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 Chest CT negative for PE. Echo showed EF of 30% (repeat echo EF improved to 48%) and subsequent abnormal cMRI suspicious for myocarditis. 3/9/22: Coronary angiogram via LRA showed no significant CAD. Started on Eliquis, ABX, Colchicine, Metoprolol and PTA Lovenox and Naprosyn were discontinued. Called patient to discuss any post hospital d/c questions he may have, review medication changes, and confirm f/u appts. Patient denied any questions regarding new medications or changes with their PTA medications. Patient denied any fever or light headedness. Mild SOB and chest discomfort with position changes, but improved from hospitalization. Continues to have 7/10 LLE pain. Will call PMD to follow up with ongoing symptoms. Denies LLE edema, warmth, pallor. LRA cardiac cath site is without bleeding, swelling, redness or signs of infection. RN confirmed with patient that he has an OV scheduled on 3/24/22 at 1345 with RHINA Suyapa Pedraza at our [facility]. Patient advised to call clinic with any cardiac related questions or concerns prior to this rhina't. Patient verbalized understanding and agreed with plan. MARLON Latif RN.

## 2022-03-18 LAB
CV A10 AB TITR SER CF: NORMAL {TITER}
CV A16 AB TITR SER CF: NORMAL {TITER}
CV A2 AB TITR SER CF: NORMAL {TITER}
CV A4 AB TITR SER CF: NORMAL {TITER}
CV A7 AB TITR SER CF: NORMAL {TITER}
CV A9 AB TITR SER CF: NORMAL {TITER}

## 2022-03-24 ENCOUNTER — OFFICE VISIT (OUTPATIENT)
Dept: CARDIOLOGY | Facility: CLINIC | Age: 62
End: 2022-03-24
Payer: COMMERCIAL

## 2022-03-24 VITALS
SYSTOLIC BLOOD PRESSURE: 111 MMHG | HEART RATE: 72 BPM | RESPIRATION RATE: 12 BRPM | WEIGHT: 246.4 LBS | DIASTOLIC BLOOD PRESSURE: 76 MMHG | BODY MASS INDEX: 33.42 KG/M2 | OXYGEN SATURATION: 95 % | TEMPERATURE: 98.2 F

## 2022-03-24 DIAGNOSIS — E78.5 HYPERLIPIDEMIA LDL GOAL <100: ICD-10-CM

## 2022-03-24 DIAGNOSIS — B33.24 VIRAL CARDIOMYOPATHY (H): ICD-10-CM

## 2022-03-24 DIAGNOSIS — I31.9 MYOPERICARDITIS: Primary | ICD-10-CM

## 2022-03-24 DIAGNOSIS — B33.22 VIRAL MYOCARDITIS, UNSPECIFIED CHRONICITY: ICD-10-CM

## 2022-03-24 PROCEDURE — 99215 OFFICE O/P EST HI 40 MIN: CPT | Performed by: NURSE PRACTITIONER

## 2022-03-24 RX ORDER — COLCHICINE 0.6 MG/1
0.6 TABLET ORAL 2 TIMES DAILY
Qty: 180 TABLET | Refills: 0 | Status: SHIPPED | OUTPATIENT
Start: 2022-03-24

## 2022-03-24 RX ORDER — METOPROLOL TARTRATE 25 MG/1
25 TABLET, FILM COATED ORAL 2 TIMES DAILY
Qty: 180 TABLET | Refills: 3 | Status: SHIPPED | OUTPATIENT
Start: 2022-03-24

## 2022-03-24 NOTE — NURSING NOTE
Initial /76 (BP Location: Left arm, Patient Position: Sitting, Cuff Size: Adult Large)   Pulse 72   Temp 98.2  F (36.8  C) (Tympanic)   Resp 12   Wt 111.8 kg (246 lb 6.4 oz)   SpO2 95%   BMI 33.42 kg/m   Estimated body mass index is 33.42 kg/m  as calculated from the following:    Height as of 3/7/22: 1.829 m (6').    Weight as of this encounter: 111.8 kg (246 lb 6.4 oz).  [place] [facility]

## 2022-03-24 NOTE — LETTER
3/24/2022    Benjamin E Van Vranken, MD  [facility]   [facility] [address]    RE: Nasim Montez       Dear Colleague,     I had the pleasure of seeing Nasim Montez in the [facility].  Cardiology Clinic Progress Note  Nasim Montez MRN# [id number]   YOB: 1960 Age: 62 year old      Primary Cardiologist:   Dr. Bustamante           History of Presenting Illness:      Nasim Montez is a pleasant 62 year old patient with a past cardiac history significant for   1. Myopericarditis  2. cardiomyopathy  3. Hyperlipidemia  Past medical history significant for PTSD, anxiety, DVT 3/2022, IVONE, prior alcohol abuse with cessation 2016    Patient was hospitalized on 3/7/2022 with fever, headache, SOB, chest pain, and found to have myocarditis and NSTEMI.  He also had recent diagnosis of DVT and strep throat from [facility] admission.  Echocardiogram 3/7/2022 with EF 30 to 35%, mild MR and TR.  Cardiac MRI 3/8/2022 with LVEF 48%, RVEF 51% and possible evidence of myocarditis, small pericardial and bilateral small pleural effusions.  Coronary angiogram 3/9/2022 with normal coronary arteries but elevated filling pressures.  Follow-up echocardiogram 3/9/2022 with EF 50 to 55% and no significant valvular disease.  He was discharged on Eliquis, antibiotic, colchicine and metoprolol.  Repeat echo was recommended but could not be done prior to his hospital follow-up because there was no availability.  Hospital discharge summary and cardiology progress notes reviewed today.    Pt presents today for hospital follow-up.  Unfortunately, he was not set up with follow-up echocardiogram at all.  We will set him up for the next available echocardiogram which is not until the end of April.  Since he was discharged, his chest pain has improved but is still present intermittently.  He describes episodes where he gets a headache associated with chest pain and kidney pain.  The  chest pain is not positional.  I have asked him to continue the colchicine for 3 months total [facility].  He  has noted fatigue but is unsure if this is due to to his illness versus side effect of his metoprolol.  We can reassess this after he has recovered more.  Blood pressure today is soft.  He does describe some lightheadedness with position changes that resolves in approximately 1 minute.  I have recommended getting up slowly and if lightheadedness is not resolving in a few seconds he will let us know.  His weight at discharge was 240 pounds and today is up 6 pounds.  However, he has a boot on his leg, is fully clothed, and was taken on different scales so I am unsure how accurate this is.  He does not currently have a scale at home to check daily weight.  He does report some dyspnea on exertion which may be due to deconditioning versus heart failure.  Left radial angiogram site is without bleeding, ecchymosis, hematoma, or bruit.  [facility]. Patient reports no PND, orthopnea, presyncope, syncope, edema, heart racing, or palpitations.    Labs:  LIPID RESULTS:  Lab Results   Component Value Date    CHOL 166 03/08/2022    HDL 34 (L) 03/08/2022    LDL 98 03/08/2022    TRIG 169 (H) 03/08/2022       LIVER ENZYME RESULTS:  Lab Results   Component Value Date    AST 32 03/08/2022    AST 18 06/02/2017    ALT 41 03/08/2022    ALT 18 06/02/2017       CBC RESULTS:  Lab Results   Component Value Date    WBC 8.1 03/10/2022    WBC 7.0 04/28/2020    RBC 4.13 (L) 03/10/2022    RBC 4.42 04/28/2020    HGB 12.9 (L) 03/10/2022    HGB 14.2 04/28/2020    HCT 38.6 (L) 03/10/2022    HCT 41.1 04/28/2020    MCV 94 03/10/2022    MCV 93 04/28/2020    MCH 31.2 03/10/2022    MCH 32.1 04/28/2020    MCHC 33.4 03/10/2022    MCHC 34.5 04/28/2020    RDW 12.1 03/10/2022    RDW 11.9 04/28/2020     03/10/2022     04/28/2020       BMP RESULTS:  Lab Results   Component Value  Date     03/10/2022     04/28/2020    POTASSIUM 4.1 03/10/2022    POTASSIUM 3.5 04/28/2020    CHLORIDE 107 03/10/2022    CHLORIDE 108 04/28/2020    CO2 27 03/10/2022    CO2 25 04/28/2020    ANIONGAP 4 03/10/2022    ANIONGAP 7 04/28/2020     (H) 03/10/2022     (H) 04/28/2020    BUN 10 03/10/2022    BUN 10 04/28/2020    CR 0.96 03/10/2022    CR 0.95 04/28/2020    GFRESTIMATED 88 03/10/2022    GFRESTIMATED >60 03/07/2022    GFRESTIMATED 86 04/28/2020    GFRESTBLACK >90 04/28/2020    IBIS 8.7 03/10/2022    IBIS 8.4 (L) 04/28/2020        A1C RESULTS:  No results found for: A1C    INR RESULTS:  No results found for: INR    Results reviewed today.       Current Cardiac Medications     Eliquis 5 mg twice daily for DVt   Aspirin 81 mg daily  Colchicine 0.6 mg twice daily  Lopressor 25 mg twice daily  Simvastatin 20 mg daily                   Assessment and Plan:       Plan    Patient Instructions   Medication Changes:  4. None     Recommendations:  1. Call with questions   2. Call if lightheadedness is not going away in a few sec with getting up slowly     Follow-up:  1. nonfasting Lab (trop, ESR, CRP)  2. Echo next available   3. See Suyapa LEWIS for cardiology follow up at [place]: 1 month      Cardiology Scheduling~[phone]  Cardiology Clinic RN~[phone] (Chelsey Live RN and Isabel MORRISON RN)          1. Myopericarditis    Nonexertional chest pain worse with deep breath and lying back    Troponin peaked at 4811    CMR with small area of mild CAD scar in the distal lateral wall could be due to myocarditis    Small pericardial and bilateral pleural effusions    Continue colchicine    No aerobic activity or heavy lifting until follow-up echo results- to ensure EF is not decreasing      2. Cardiomyopathy    EF 30-35% improved to 50-55% before discharge 3/2022    Normal coronary arteries    FRANK possibly r/t deconditioning     Euvolemic     Continue beta-blocker      3. Hyperlipidemia    Last LDL  98 3/2022    Continue simvastatin              Thank you for allowing me to participate in this delightful patient's care.      This note was completed in part using Dragon voice recognition software. Although reviewed after completion, some word and grammatical errors may occur.    JANNETTE Singleton CNP, APRN, CNP           Data:   All laboratory data reviewed      Total time spent today was 40 mins, reviewing labs, testing, notes, documenting notes, and seeing patient.          Constitutional:  cooperative, alert and oriented, well developed, well nourished, in no acute distress  overweight    Skin:  warm and dry to the touch         Head:  normocephalic       Eyes:  pupils equal and round       ENT:  no pallor or cyanosis       Neck:  no stiffness       Respiratory:  clear to auscultation; normal symmetry        Cardiac: regular rate and rhythm; normal S1 and S2                 pulses full and equal     GI:  abdomen soft, nondistended     Extremities and Muscular Skeletal:  no edema        Neurological:  affect appropriate; no gross motor deficits       Psych:  Alert and Oriented x 3 , appropriate affact        Thank you for allowing me to participate in the care of your patient.      Sincerely,     JANNETTE Singleton CNP     [facility]  cc:   Referred MD Jozef  No address on file

## 2022-03-24 NOTE — PATIENT INSTRUCTIONS
Medication Changes:  1. None     Recommendations:  1. Call with questions   2. Call if lightheadedness is not going away in a few sec with getting up slowly     Follow-up:  1. nonfasting Lab (trop, ESR, CRP)  2. Echo next available   3. See Suyapa LEWIS for cardiology follow up at [facility]: 1 month      Cardiology Scheduling~[phone]  Cardiology Clinic RN~[phone] (Chelsey Live, RN and Isabel MORRISON RN)

## 2022-03-24 NOTE — PROGRESS NOTES
Cardiology Clinic Progress Note  Nasim Montez MRN# [id number]   YOB: 1960 Age: 62 year old      Primary Cardiologist:   Dr. Bustamante           History of Presenting Illness:      Nasim Montez is a pleasant 62 year old patient with a past cardiac history significant for   1. Myopericarditis  2. cardiomyopathy  3. Hyperlipidemia  Past medical history significant for PTSD, anxiety, DVT 3/2022, IVONE, prior alcohol abuse with cessation 2016    Patient was hospitalized on 3/7/2022 with fever, headache, SOB, chest pain, and found to have myocarditis and NSTEMI.  He also had recent diagnosis of DVT and strep throat from [facility] admission.  Echocardiogram 3/7/2022 with EF 30 to 35%, mild MR and TR.  Cardiac MRI 3/8/2022 with LVEF 48%, RVEF 51% and possible evidence of myocarditis, small pericardial and bilateral small pleural effusions.  Coronary angiogram 3/9/2022 with normal coronary arteries but elevated filling pressures.  Follow-up echocardiogram 3/9/2022 with EF 50 to 55% and no significant valvular disease.  He was discharged on Eliquis, antibiotic, colchicine and metoprolol.  Repeat echo was recommended but could not be done prior to his hospital follow-up because there was no availability.  Hospital discharge summary and cardiology progress notes reviewed today.    Pt presents today for hospital follow-up.  Unfortunately, he was not set up with follow-up echocardiogram at all.  We will set him up for the next available echocardiogram which is not until the end of April.  Since he was discharged, his chest pain has improved but is still present intermittently.  He describes episodes where he gets a headache associated with chest pain and kidney pain.  The chest pain is not positional.  I have asked him to continue the colchicine for 3 months total and [facility]  He  has noted fatigue but is unsure if this is due to to his illness versus side effect of his metoprolol.  We can  reassess this after he has recovered more.  Blood pressure today is soft.  He does describe some lightheadedness with position changes that resolves in approximately 1 minute.  I have recommended getting up slowly and if lightheadedness is not resolving in a few seconds he will let us know.  His weight at discharge was 240 pounds and today is up 6 pounds.  However, he has a boot on his leg, is fully clothed, and was taken on different scales so I am unsure how accurate this is.  He does not currently have a scale at home to check daily weight.  He does report some dyspnea on exertion which may be due to deconditioning versus heart failure.  Left radial angiogram site is without bleeding, ecchymosis, hematoma, or bruit.  He [facility]. Patient reports no PND, orthopnea, presyncope, syncope, edema, heart racing, or palpitations.    Labs:  LIPID RESULTS:  Lab Results   Component Value Date    CHOL 166 03/08/2022    HDL 34 (L) 03/08/2022    LDL 98 03/08/2022    TRIG 169 (H) 03/08/2022       LIVER ENZYME RESULTS:  Lab Results   Component Value Date    AST 32 03/08/2022    AST 18 06/02/2017    ALT 41 03/08/2022    ALT 18 06/02/2017       CBC RESULTS:  Lab Results   Component Value Date    WBC 8.1 03/10/2022    WBC 7.0 04/28/2020    RBC 4.13 (L) 03/10/2022    RBC 4.42 04/28/2020    HGB 12.9 (L) 03/10/2022    HGB 14.2 04/28/2020    HCT 38.6 (L) 03/10/2022    HCT 41.1 04/28/2020    MCV 94 03/10/2022    MCV 93 04/28/2020    MCH 31.2 03/10/2022    MCH 32.1 04/28/2020    MCHC 33.4 03/10/2022    MCHC 34.5 04/28/2020    RDW 12.1 03/10/2022    RDW 11.9 04/28/2020     03/10/2022     04/28/2020       BMP RESULTS:  Lab Results   Component Value Date     03/10/2022     04/28/2020    POTASSIUM 4.1 03/10/2022    POTASSIUM 3.5 04/28/2020    CHLORIDE 107 03/10/2022    CHLORIDE 108 04/28/2020    CO2 27 03/10/2022    CO2 25 04/28/2020    ANIONGAP 4 03/10/2022    ANIONGAP 7  04/28/2020     (H) 03/10/2022     (H) 04/28/2020    BUN 10 03/10/2022    BUN 10 04/28/2020    CR 0.96 03/10/2022    CR 0.95 04/28/2020    GFRESTIMATED 88 03/10/2022    GFRESTIMATED >60 03/07/2022    GFRESTIMATED 86 04/28/2020    GFRESTBLACK >90 04/28/2020    IBIS 8.7 03/10/2022    IBIS 8.4 (L) 04/28/2020        A1C RESULTS:  No results found for: A1C    INR RESULTS:  No results found for: INR    Results reviewed today.       Current Cardiac Medications     Eliquis 5 mg twice daily for DVt   Aspirin 81 mg daily  Colchicine 0.6 mg twice daily  Lopressor 25 mg twice daily  Simvastatin 20 mg daily                   Assessment and Plan:       Plan    Patient Instructions   Medication Changes:  4. None     Recommendations:  1. Call with questions   2. Call if lightheadedness is not going away in a few sec with getting up slowly     Follow-up:  1. nonfasting Lab (trop, ESR, CRP)  2. Echo next available   3. See Suyapa LEWIS for cardiology follow up at [place]: 1 month      Cardiology Scheduling~[phone]  Cardiology Clinic RN~[phone] (Chelsey Live, JEFERSON and Isabel MORRISON RN)          1. Myopericarditis    Nonexertional chest pain worse with deep breath and lying back    Troponin peaked at 4811    CMR with small area of mild CAD scar in the distal lateral wall could be due to myocarditis    Small pericardial and bilateral pleural effusions    Continue colchicine    No aerobic activity or heavy lifting until follow-up echo results- to ensure EF is not decreasing      2. Cardiomyopathy    EF 30-35% improved to 50-55% before discharge 3/2022    Normal coronary arteries    FRANK possibly r/t deconditioning     Euvolemic     Continue beta-blocker      3. Hyperlipidemia    Last LDL 98 3/2022    Continue simvastatin              Thank you for allowing me to participate in this delightful patient's care.      This note was completed in part using Dragon voice recognition software. Although reviewed after completion,  some word and grammatical errors may occur.    Suyapa Fajardo, JANNETTE CNP, APRN, CNP           Data:   All laboratory data reviewed      Total time spent today was 40 mins, reviewing labs, testing, notes, documenting notes, and seeing patient.          Constitutional:  cooperative, alert and oriented, well developed, well nourished, in no acute distress  overweight    Skin:  warm and dry to the touch         Head:  normocephalic       Eyes:  pupils equal and round       ENT:  no pallor or cyanosis       Neck:  no stiffness       Respiratory:  clear to auscultation; normal symmetry        Cardiac: regular rate and rhythm; normal S1 and S2                 pulses full and equal     GI:  abdomen soft, nondistended     Extremities and Muscular Skeletal:  no edema        Neurological:  affect appropriate; no gross motor deficits       Psych:  Alert and Oriented x 3 , appropriate affact

## 2023-05-12 DIAGNOSIS — N40.0 BPH (BENIGN PROSTATIC HYPERPLASIA): Primary | ICD-10-CM

## 2023-05-30 ENCOUNTER — OFFICE VISIT (OUTPATIENT)
Dept: UROLOGY | Facility: CLINIC | Age: 63
End: 2023-05-30
Payer: COMMERCIAL

## 2023-05-30 ENCOUNTER — LAB (OUTPATIENT)
Dept: LAB | Facility: CLINIC | Age: 63
End: 2023-05-30
Payer: COMMERCIAL

## 2023-05-30 VITALS
HEIGHT: 72 IN | BODY MASS INDEX: 32.76 KG/M2 | WEIGHT: 241.9 LBS | RESPIRATION RATE: 14 BRPM | HEART RATE: 85 BPM | SYSTOLIC BLOOD PRESSURE: 119 MMHG | TEMPERATURE: 98.1 F | DIASTOLIC BLOOD PRESSURE: 75 MMHG

## 2023-05-30 DIAGNOSIS — N40.0 BPH (BENIGN PROSTATIC HYPERPLASIA): ICD-10-CM

## 2023-05-30 DIAGNOSIS — N40.0 BENIGN PROSTATIC HYPERPLASIA, UNSPECIFIED WHETHER LOWER URINARY TRACT SYMPTOMS PRESENT: Primary | ICD-10-CM

## 2023-05-30 LAB — PSA SERPL DL<=0.01 NG/ML-MCNC: 2.21 NG/ML (ref 0–4.5)

## 2023-05-30 PROCEDURE — 36415 COLL VENOUS BLD VENIPUNCTURE: CPT | Performed by: PATHOLOGY

## 2023-05-30 PROCEDURE — 84153 ASSAY OF PSA TOTAL: CPT | Performed by: PATHOLOGY

## 2023-05-30 PROCEDURE — 52000 CYSTOURETHROSCOPY: CPT | Performed by: STUDENT IN AN ORGANIZED HEALTH CARE EDUCATION/TRAINING PROGRAM

## 2023-05-30 PROCEDURE — 51741 ELECTRO-UROFLOWMETRY FIRST: CPT | Performed by: STUDENT IN AN ORGANIZED HEALTH CARE EDUCATION/TRAINING PROGRAM

## 2023-05-30 NOTE — PROGRESS NOTES
UROLOGY OUTPATIENT VISIT      Chief Complaint:   Difficulty urinating      Synopsis   Nasim Montez is a very pleasant AGE: 63 year old year old person with history of  Myopericarditis, he had cath on 3/9/2023 which did not show any significant CAD. He is on aspirin 81 daily. He also had left posterior tibial vein DVT     He is here because he is interested in the butterfly study  He has had worsening straining to urinate over last 10+ years  Now he has to push to urinate and he has weak stream and he has to sit to urinate    Incomplete Emptying: 3  Frequency:  2  Intermittency:  5  Urgency:  4  Weak Stream:  4  Strainin  Sleepin  Total: 26  QoL:Terrible     MSHQ-EjD  2-2-2-4  Less than half the time able to ejaculate  Ejaculation less strong as it used to be    SANTA score  2-2-2-2-2 = 10 Moderate ED    Flowrate  Qmax 4, VV 92     CYSTOSCOPY  After obtaining informed consent, the patient was prepped and draped in the standard sterile fashion.  The 15 [place] flexible cystoscope was inserted through the urethral meatus but met resistance almost immediately    He had a coronal hypospadias  We used bougies starting at 8 [place]er and dilated to 10 [place] and slowly up to 18 [place]er  We were able enter the urethra with the cystoscope    The anterior urethra was:  normal    The external sphincter was appropriately coapted.   The prostatic urethra demonstrated no lateral lobe hypertrophy small median lobe.     The bladder neck was high    The bladder was unremarkable for tumors, erythema or stones.   There were mild trabeculations.    On retroflexion there was the usual bladder neck hyperemia.      UROFLOW/PVR after   and QMax 7  PVR was minimal    He felt the urination and stream was much better after.    TRUS sizing was attempted but unable to get good measurements due to stool in rectum    Imaging  None    The following distinct labs were reviewed   PSA 2.21    Medical Comorbidities    No past  medical history on file.   1. Myopericarditis  2. cardiomyopathy  3. Hyperlipidemia         Medications     Current Outpatient Medications   Medication     acetaminophen (TYLENOL) 500 MG tablet     aspirin 81 MG EC tablet     celecoxib (CELEBREX) 200 MG capsule     clonazePAM (KLONOPIN) 0.5 MG tablet     colchicine (COLCYRS) 0.6 MG tablet     DULoxetine (CYMBALTA) 60 MG capsule     gabapentin (NEURONTIN) 100 MG capsule     ibuprofen (ADVIL/MOTRIN) 400 MG tablet     metoprolol tartrate (LOPRESSOR) 25 MG tablet     Multiple Vitamins-Minerals (CENTRUM SILVER) per tablet     nortriptyline (PAMELOR) 25 MG capsule     ondansetron (ZOFRAN-ODT) 8 MG ODT tab     oxyCODONE (OXY-IR) 5 MG capsule     risperiDONE (RISPERDAL) 0.5 MG tablet     simvastatin (ZOCOR) 40 MG tablet     tamsulosin (FLOMAX) 0.4 MG capsule     traZODone (DESYREL) 100 MG tablet     No current facility-administered medications for this visit.            Assessment/Plan   63 year old year old person with hx Myopericarditis, hyperlipidemia    1. Hypospadias with stricture - his weak stream and urinary symptoms likely from the stricture near the meatus and coronal  Hypospadias. We dilated and his stream improved and he emptied to completion. I had discussion with him about this and we discussed that stricture can recur and need to monitor. Discussed also surgical option meatotomy vs stricture repair vs self dilation. He is interested in observing for recurrence for now. We will plan to have him return to clinic in couple months with one of our reconstructive doctors    Unfortunately he is not a candidate for the study because of stricture  If he is still bothered and does not have recurrence of stricture then we can consider doing a median lobe removal with Dr Jimenez. He has small median lobe. But I suspect most of voiding issues from stricture as he did report improvement after it was dilated.    CC:  Van Vranken, Benjamin E

## 2023-05-30 NOTE — NURSING NOTE
Chief Complaint   Patient presents with     Consult     [facility] Medical Study       Blood pressure 119/75, pulse 85, temperature 98.1  F (36.7  C), temperature source Oral, resp. rate 14, height 1.829 m (6'), weight 109.7 kg (241 lb 14.4 oz). Body mass index is 32.81 kg/m .    Patient Active Problem List   Diagnosis     NSTEMI (non-ST elevated myocardial infarction) (H)     Acute deep vein thrombosis (DVT) of calf muscle vein of left lower extremity (H)       Allergies   Allergen Reactions     Other Environmental Allergy Hives     Neoprene , rubber products      Iodine Rash     Topical        Current Outpatient Medications   Medication Sig Dispense Refill     clonazePAM (KLONOPIN) 0.5 MG tablet Take 1 mg by mouth At Bedtime       metoprolol tartrate (LOPRESSOR) 25 MG tablet Take 1 tablet (25 mg) by mouth 2 times daily 180 tablet 3     Multiple Vitamins-Minerals (CENTRUM SILVER) per tablet Take 1 tablet by mouth daily       oxyCODONE (OXY-IR) 5 MG capsule Take 5 mg by mouth every 4 hours as needed for severe pain       simvastatin (ZOCOR) 40 MG tablet Take 20 mg by mouth At Bedtime       acetaminophen (TYLENOL) 500 MG tablet Take 500-1,000 mg by mouth every 6 hours as needed for mild pain (Patient not taking: Reported on 5/30/2023)       aspirin 81 MG EC tablet Take 1 tablet (81 mg) by mouth daily (Patient not taking: Reported on 5/30/2023) 30 tablet 0     celecoxib (CELEBREX) 200 MG capsule Take 200 mg by mouth 2 times daily as needed for moderate pain (Patient not taking: Reported on 5/30/2023)       colchicine (COLCYRS) 0.6 MG tablet Take 1 tablet (0.6 mg) by mouth 2 times daily (Patient not taking: Reported on 5/30/2023) 180 tablet 0     DULoxetine (CYMBALTA) 60 MG capsule Take 1 capsule (60 mg) by mouth daily (Patient not taking: Reported on 5/30/2023) 30 capsule 0     gabapentin (NEURONTIN) 100 MG capsule Take 300 mg by mouth 3 times daily as needed (Patient not taking: Reported on 5/30/2023)       ibuprofen  (ADVIL/MOTRIN) 400 MG tablet Take 1 tablet (400 mg) by mouth every 6 hours as needed for moderate pain (Patient not taking: Reported on 2023) 40 tablet 0     nortriptyline (PAMELOR) 25 MG capsule Take 25 mg by mouth At Bedtime (Patient not taking: Reported on 2023)       ondansetron (ZOFRAN-ODT) 8 MG ODT tab Take 8 mg by mouth every 8 hours as needed for nausea (Patient not taking: Reported on 2023)       risperiDONE (RISPERDAL) 0.5 MG tablet Take 0.5 mg by mouth 2 times daily (Patient not taking: Reported on 2023)       tamsulosin (FLOMAX) 0.4 MG capsule Take 0.4 mg by mouth daily (Patient not taking: Reported on 2023)       traZODone (DESYREL) 100 MG tablet Take 200 mg by mouth nightly as needed  (Patient not taking: Reported on 2023)         Social History     Tobacco Use     Smoking status: Never     Smokeless tobacco: Never   Substance Use Topics     Alcohol use: Not Currently     Drug use: Never       Invasive Procedure Safety Checklist:    Procedure: Cystoscopy    Action: Complete sections and checkboxes as appropriate.    Pre-procedure:  1. Patient ID Verified with 2 identifiers (Jolynn and  or MRN) : YES    2. Procedure and site verified with patient/designee (when able) : YES    3. Accurate consent documentation in medical record : YES    4. H&P (or appropriate assessment) documented in medical record : N/A  H&P must be up to 30 days prior to procedure an updated within 24 hours of                 Procedure as applicable.     5. Relevant diagnostic and radiology test results appropriately labeled and displayed as applicable : YES    6. Blood products, implants, devices, and/or special equipment available for the procedure as applicable : YES    7. Procedure site(s) marked with provider initials [Exclusions: none] : NO    8. Marking not required. Reason : Yes  Procedure does not require site marking    Time Out:     Time-Out performed immediately prior to starting procedure,  including verbal and active participation of all team members addressing: YES    1. Correct patient identity.  2. Confirmed that the correct side and site are marked.  3. An accurate procedure to be done.  4. Agreement on the procedure to be done.  5. Correct patient position.  6. Relevant images and results are properly labeled and appropriately displayed.  7. The need to administer antibiotics or fluids for irrigation purposes during the procedure as applicable.  8. Safety precautions based on patient history or medication use.    During Procedure: Verification of correct person, site, and procedure occurs any time the responsibility for care of the patient is transferred to another member of the care team.    The following medication was given:     MEDICATION:  Lidocaine without epinephrine 2% jelly  ROUTE: urethral   SITE: urethral   DOSE: 10 mL  LOT #: [id number]  : [employer]  EXPIRATION DATE: 2/25  NDC#: [address]-1   Was there drug waste? No    Prior to med admin, verified patient identity using patient's name and date of birth.  Due to med administration, patient instructed to remain in clinic for 15 minutes  afterwards, and to report any adverse reaction to me immediately.    Drug Amount Wasted:  None.  Vial/Syringe: Syringe      Wendy Valenzuela  5/30/2023  4:02 PM

## 2023-05-30 NOTE — LETTER
2023       RE: Nasim Montez  [address]  [facility] [address]     Dear Colleague,    Thank you for referring your patient, Nasim Montez, to the [facility] [place] at [facility]. Please see a copy of my visit note below.          UROLOGY OUTPATIENT VISIT      Chief Complaint:   Difficulty urinating      Synopsis   Nasim Montez is a very pleasant AGE: 63 year old year old person with history of  Myopericarditis, he had cath on 3/9/2023 which did not show any significant CAD. He is on aspirin 81 daily. He also had left posterior tibial vein DVT     He is here because he is interested in the butterfly study  He has had worsening straining to urinate over last 10+ years  Now he has to push to urinate and he has weak stream and he has to sit to urinate    Incomplete Emptying: 3  Frequency:  2  Intermittency:  5  Urgency:  4  Weak Stream:  4  Strainin  Sleepin  Total: 26  QoL:Terrible     MSHQ-EjD  2-2-2-4  Less than half the time able to ejaculate  Ejaculation less strong as it used to be    SANTA score  2-2-2-2-2 = 10 Moderate ED    Flowrate  Qmax 4, VV 92     CYSTOSCOPY  After obtaining informed consent, the patient was prepped and draped in the standard sterile fashion.  The 15 Moroccan flexible cystoscope was inserted through the urethral meatus but met resistance almost immediately    He had a coronal hypospadias  We used bougies starting at 8 Moroccan and dilated to 10 Moroccan and slowly up to 18 Moroccan  We were able enter the urethra with the cystoscope    The anterior urethra was:  normal    The external sphincter was appropriately coapted.   The prostatic urethra demonstrated no lateral lobe hypertrophy small median lobe.     The bladder neck was high    The bladder was unremarkable for tumors, erythema or stones.   There were mild trabeculations.    On retroflexion there was the usual bladder  neck hyperemia.      UROFLOW/PVR after   and QMax 7  PVR was minimal    He felt the urination and stream was much better after.    TRUS sizing was attempted but unable to get good measurements due to stool in rectum    Imaging  None    The following distinct labs were reviewed   PSA 2.21    Medical Comorbidities    No past medical history on file.   Myopericarditis  cardiomyopathy  Hyperlipidemia         Medications     Current Outpatient Medications   Medication    acetaminophen (TYLENOL) 500 MG tablet    aspirin 81 MG EC tablet    celecoxib (CELEBREX) 200 MG capsule    clonazePAM (KLONOPIN) 0.5 MG tablet    colchicine (COLCYRS) 0.6 MG tablet    DULoxetine (CYMBALTA) 60 MG capsule    gabapentin (NEURONTIN) 100 MG capsule    ibuprofen (ADVIL/MOTRIN) 400 MG tablet    metoprolol tartrate (LOPRESSOR) 25 MG tablet    Multiple Vitamins-Minerals (CENTRUM SILVER) per tablet    nortriptyline (PAMELOR) 25 MG capsule    ondansetron (ZOFRAN-ODT) 8 MG ODT tab    oxyCODONE (OXY-IR) 5 MG capsule    risperiDONE (RISPERDAL) 0.5 MG tablet    simvastatin (ZOCOR) 40 MG tablet    tamsulosin (FLOMAX) 0.4 MG capsule    traZODone (DESYREL) 100 MG tablet     No current facility-administered medications for this visit.            Assessment/Plan   63 year old year old person with hx Myopericarditis, hyperlipidemia    1. Hypospadias with stricture - his weak stream and urinary symptoms likely from the stricture near the meatus and coronal  Hypospadias. We dilated and his stream improved and he emptied to completion. I had discussion with him about this and we discussed that stricture can recur and need to monitor. Discussed also surgical option meatotomy vs stricture repair vs self dilation. He is interested in observing for recurrence for now. We will plan to have him return to clinic in couple months with one of our reconstructive doctors    Unfortunately he is not a candidate for the study because of stricture  If he is still  bothered and does not have recurrence of stricture then we can consider doing a median lobe removal with Dr Jimenez. He has small median lobe. But I suspect most of voiding issues from stricture as he did report improvement after it was dilated.    CC:  Van Vranken, Benjamin E        Sincerely,    Franco Cintron MD

## 2023-05-30 NOTE — NURSING NOTE
Chief Complaint   Patient presents with     Consult     [facility] Study     /75   Pulse 85   Temp 98.1  F (36.7  C) (Oral)   Resp 14   Ht 1.829 m (6')   Wt 109.7 kg (241 lb 14.4 oz)   BMI 32.81 kg/m   Estimated body mass index is 32.81 kg/m  as calculated from the following:    Height as of this encounter: 1.829 m (6').    Weight as of this encounter: 109.7 kg (241 lb 14.4 oz).  bp completed using cuff size: large      Health Maintenance that is potentially due pending provider review:  NONE    Darlin Adams   Clinical Research Coordinator   [facility]   Urology Research

## 2023-06-09 RX ORDER — LIDOCAINE HYDROCHLORIDE 20 MG/ML
JELLY TOPICAL ONCE
Status: ACTIVE | OUTPATIENT
Start: 2023-06-09

## 2023-08-24 ENCOUNTER — PRE VISIT (OUTPATIENT)
Dept: UROLOGY | Facility: CLINIC | Age: 63
End: 2023-08-24

## 2023-08-24 NOTE — TELEPHONE ENCOUNTER
Reason for visit: Cystoscopy     Dx/Hx/Sx: Hypospadias with stricture    Records/imaging/labs/orders: In EPIC    At Rooming: MAT Ray  08/24/23  9:05 AM
